# Patient Record
Sex: FEMALE | Race: WHITE | Employment: OTHER | URBAN - METROPOLITAN AREA
[De-identification: names, ages, dates, MRNs, and addresses within clinical notes are randomized per-mention and may not be internally consistent; named-entity substitution may affect disease eponyms.]

---

## 2017-04-13 ENCOUNTER — OP HISTORICAL/CONVERTED ENCOUNTER (OUTPATIENT)
Dept: OTHER | Age: 72
End: 2017-04-13

## 2017-05-11 ENCOUNTER — OP HISTORICAL/CONVERTED ENCOUNTER (OUTPATIENT)
Dept: OTHER | Age: 72
End: 2017-05-11

## 2018-03-16 ENCOUNTER — IP HISTORICAL/CONVERTED ENCOUNTER (OUTPATIENT)
Dept: OTHER | Age: 73
End: 2018-03-16

## 2018-03-21 ENCOUNTER — OP HISTORICAL/CONVERTED ENCOUNTER (OUTPATIENT)
Dept: OTHER | Age: 73
End: 2018-03-21

## 2018-03-28 ENCOUNTER — OP HISTORICAL/CONVERTED ENCOUNTER (OUTPATIENT)
Dept: OTHER | Age: 73
End: 2018-03-28

## 2018-03-30 ENCOUNTER — OP HISTORICAL/CONVERTED ENCOUNTER (OUTPATIENT)
Dept: OTHER | Age: 73
End: 2018-03-30

## 2018-04-04 ENCOUNTER — OP HISTORICAL/CONVERTED ENCOUNTER (OUTPATIENT)
Dept: OTHER | Age: 73
End: 2018-04-04

## 2018-04-06 ENCOUNTER — OP HISTORICAL/CONVERTED ENCOUNTER (OUTPATIENT)
Dept: OTHER | Age: 73
End: 2018-04-06

## 2018-04-09 ENCOUNTER — OP HISTORICAL/CONVERTED ENCOUNTER (OUTPATIENT)
Dept: OTHER | Age: 73
End: 2018-04-09

## 2018-05-24 ENCOUNTER — OP HISTORICAL/CONVERTED ENCOUNTER (OUTPATIENT)
Dept: OTHER | Age: 73
End: 2018-05-24

## 2018-06-28 ENCOUNTER — OP HISTORICAL/CONVERTED ENCOUNTER (OUTPATIENT)
Dept: OTHER | Age: 73
End: 2018-06-28

## 2019-01-29 ENCOUNTER — OP HISTORICAL/CONVERTED ENCOUNTER (OUTPATIENT)
Dept: OTHER | Age: 74
End: 2019-01-29

## 2019-05-28 ENCOUNTER — OP HISTORICAL/CONVERTED ENCOUNTER (OUTPATIENT)
Dept: OTHER | Age: 74
End: 2019-05-28

## 2019-06-06 ENCOUNTER — OP HISTORICAL/CONVERTED ENCOUNTER (OUTPATIENT)
Dept: OTHER | Age: 74
End: 2019-06-06

## 2019-07-23 ENCOUNTER — OP HISTORICAL/CONVERTED ENCOUNTER (OUTPATIENT)
Dept: OTHER | Age: 74
End: 2019-07-23

## 2020-01-23 ENCOUNTER — OP HISTORICAL/CONVERTED ENCOUNTER (OUTPATIENT)
Dept: OTHER | Age: 75
End: 2020-01-23

## 2020-06-17 ENCOUNTER — OP HISTORICAL/CONVERTED ENCOUNTER (OUTPATIENT)
Dept: OTHER | Age: 75
End: 2020-06-17

## 2020-07-09 ENCOUNTER — OP HISTORICAL/CONVERTED ENCOUNTER (OUTPATIENT)
Dept: OTHER | Age: 75
End: 2020-07-09

## 2020-07-14 ENCOUNTER — OFFICE VISIT (OUTPATIENT)
Dept: SURGERY | Age: 75
End: 2020-07-14

## 2020-07-23 VITALS
WEIGHT: 164.6 LBS | SYSTOLIC BLOOD PRESSURE: 140 MMHG | HEIGHT: 65 IN | TEMPERATURE: 98.4 F | BODY MASS INDEX: 27.42 KG/M2 | HEART RATE: 71 BPM | DIASTOLIC BLOOD PRESSURE: 69 MMHG

## 2020-07-23 PROBLEM — N39.0 UTI (URINARY TRACT INFECTION): Status: ACTIVE | Noted: 2020-07-23

## 2020-07-23 PROBLEM — N20.0 KIDNEY STONE: Status: ACTIVE | Noted: 2020-07-23

## 2020-07-23 PROBLEM — N35.919 URETHRAL STRICTURE: Status: ACTIVE | Noted: 2020-07-23

## 2020-07-23 PROBLEM — R35.1 NOCTURIA: Status: ACTIVE | Noted: 2020-07-23

## 2020-07-23 PROBLEM — N20.1 URETERIC STONE: Status: ACTIVE | Noted: 2020-07-23

## 2020-07-23 PROBLEM — R31.29 MICROSCOPIC HEMATURIA: Status: ACTIVE | Noted: 2020-07-23

## 2020-07-23 RX ORDER — BISMUTH SUBSALICYLATE 262 MG
1 TABLET,CHEWABLE ORAL DAILY
COMMUNITY

## 2020-07-23 RX ORDER — PNEUMOCOCCAL 13-VALENT CONJUGATE VACCINE 2.2; 2.2; 2.2; 2.2; 2.2; 4.4; 2.2; 2.2; 2.2; 2.2; 2.2; 2.2; 2.2 UG/.5ML; UG/.5ML; UG/.5ML; UG/.5ML; UG/.5ML; UG/.5ML; UG/.5ML; UG/.5ML; UG/.5ML; UG/.5ML; UG/.5ML; UG/.5ML; UG/.5ML
0.5 INJECTION, SUSPENSION INTRAMUSCULAR
Status: ON HOLD | COMMUNITY
End: 2020-09-30

## 2020-07-23 RX ORDER — PNEUMOCOCCAL VACCINE POLYVALENT 25; 25; 25; 25; 25; 25; 25; 25; 25; 25; 25; 25; 25; 25; 25; 25; 25; 25; 25; 25; 25; 25; 25 UG/.5ML; UG/.5ML; UG/.5ML; UG/.5ML; UG/.5ML; UG/.5ML; UG/.5ML; UG/.5ML; UG/.5ML; UG/.5ML; UG/.5ML; UG/.5ML; UG/.5ML; UG/.5ML; UG/.5ML; UG/.5ML; UG/.5ML; UG/.5ML; UG/.5ML; UG/.5ML; UG/.5ML; UG/.5ML; UG/.5ML
0.5 INJECTION, SOLUTION INTRAMUSCULAR; SUBCUTANEOUS
Status: ON HOLD | COMMUNITY
End: 2020-09-30

## 2020-07-23 RX ORDER — ERYTHROMYCIN 5 MG/G
OINTMENT OPHTHALMIC
Status: ON HOLD | COMMUNITY
End: 2020-09-30

## 2020-07-23 RX ORDER — LISINOPRIL 5 MG/1
TABLET ORAL DAILY
COMMUNITY

## 2020-07-23 RX ORDER — LEVOTHYROXINE SODIUM 75 UG/1
TABLET ORAL
COMMUNITY

## 2020-07-23 RX ORDER — INFLUENZA A VIRUS A/MICHIGAN/45/2015 X-275 (H1N1) ANTIGEN (FORMALDEHYDE INACTIVATED), INFLUENZA A VIRUS A/SINGAPORE/INFIMH-16-0019/2016 IVR-186 (H3N2) ANTIGEN (FORMALDEHYDE INACTIVATED), AND INFLUENZA B VIRUS B/MARYLAND/15/2016 BX-69A (A B/COLORADO/6/2017-LIKE VIRUS) ANTIGEN (FORMALDEHYDE INACTIVATED) 60; 60; 60 UG/.5ML; UG/.5ML; UG/.5ML
0.5 INJECTION, SUSPENSION INTRAMUSCULAR
Status: ON HOLD | COMMUNITY
End: 2020-09-30

## 2020-07-23 RX ORDER — ASPIRIN 81 MG/1
TABLET ORAL DAILY
COMMUNITY

## 2020-07-23 RX ORDER — ATORVASTATIN CALCIUM 10 MG/1
TABLET, FILM COATED ORAL DAILY
COMMUNITY

## 2020-07-23 RX ORDER — POLYETHYLENE GLYCOL 3350 17 G/17G
17 POWDER, FOR SOLUTION ORAL DAILY
COMMUNITY

## 2020-08-10 ENCOUNTER — TELEPHONE (OUTPATIENT)
Dept: SURGERY | Age: 75
End: 2020-08-10

## 2020-09-24 ENCOUNTER — OFFICE VISIT (OUTPATIENT)
Dept: UROLOGY | Age: 75
End: 2020-09-24
Payer: MEDICARE

## 2020-09-24 ENCOUNTER — HOSPITAL ENCOUNTER (OUTPATIENT)
Dept: GENERAL RADIOLOGY | Age: 75
Discharge: HOME | End: 2020-09-24
Payer: MEDICARE

## 2020-09-24 VITALS
TEMPERATURE: 98.3 F | WEIGHT: 166 LBS | DIASTOLIC BLOOD PRESSURE: 77 MMHG | HEIGHT: 65 IN | BODY MASS INDEX: 27.66 KG/M2 | SYSTOLIC BLOOD PRESSURE: 135 MMHG

## 2020-09-24 VITALS
BODY MASS INDEX: 26.82 KG/M2 | DIASTOLIC BLOOD PRESSURE: 72 MMHG | SYSTOLIC BLOOD PRESSURE: 124 MMHG | TEMPERATURE: 98.1 F | WEIGHT: 161 LBS | HEIGHT: 65 IN

## 2020-09-24 DIAGNOSIS — R31.29 MICROSCOPIC HEMATURIA: ICD-10-CM

## 2020-09-24 DIAGNOSIS — N39.0 URINARY TRACT INFECTION WITHOUT HEMATURIA, SITE UNSPECIFIED: ICD-10-CM

## 2020-09-24 DIAGNOSIS — N20.0 KIDNEY STONE: ICD-10-CM

## 2020-09-24 DIAGNOSIS — N20.1 URETERIC STONE: Primary | ICD-10-CM

## 2020-09-24 DIAGNOSIS — R35.1 NOCTURIA: ICD-10-CM

## 2020-09-24 LAB
BILIRUB UR QL STRIP: NEGATIVE
GLUCOSE UR-MCNC: NEGATIVE MG/DL
KETONES P FAST UR STRIP-MCNC: NEGATIVE MG/DL
PH UR STRIP: 7 [PH] (ref 4.6–8)
PROT UR QL STRIP: NEGATIVE
SP GR UR STRIP: 1.01 (ref 1–1.03)
UA UROBILINOGEN AMB POC: NORMAL (ref 0.2–1)
URINALYSIS CLARITY POC: CLEAR
URINALYSIS COLOR POC: YELLOW
URINE BLOOD POC: NORMAL
URINE LEUKOCYTES POC: NORMAL
URINE NITRITES POC: NEGATIVE

## 2020-09-24 PROCEDURE — 74018 RADEX ABDOMEN 1 VIEW: CPT

## 2020-09-24 PROCEDURE — 81003 URINALYSIS AUTO W/O SCOPE: CPT | Performed by: UROLOGY

## 2020-09-24 PROCEDURE — 99214 OFFICE O/P EST MOD 30 MIN: CPT | Performed by: UROLOGY

## 2020-09-24 PROCEDURE — G8427 DOCREV CUR MEDS BY ELIG CLIN: HCPCS | Performed by: UROLOGY

## 2020-09-24 RX ORDER — OXYCODONE AND ACETAMINOPHEN 10; 325 MG/1; MG/1
1 TABLET ORAL
Qty: 7 TAB | Refills: 0 | Status: ON HOLD | OUTPATIENT
Start: 2020-09-24 | End: 2020-09-30

## 2020-09-24 RX ORDER — TIZANIDINE 4 MG/1
4 TABLET ORAL
Status: ON HOLD | COMMUNITY
End: 2020-09-30

## 2020-09-24 RX ORDER — NITROFURANTOIN 25; 75 MG/1; MG/1
100 CAPSULE ORAL 2 TIMES DAILY
Status: ON HOLD | COMMUNITY
End: 2020-09-30

## 2020-09-24 NOTE — PROGRESS NOTES
HPI ROS PE NOTE          History of Present Illness   Chief complaint: Follow-up for kidney stones  La Serna is a 67 y.o. female who presents with recurrent urolithiasis. Was seen 6 months ago had a KUB done showed large lower pole left renal calculus and some smaller stones. Previously had extracorporal shockwave lithotripsies for stones on both sides. .  Last ESWL was 2018. Has also been treated for recurrent urinary tract infections, none recently. The patient reports that she had an episode of left flank and abdominal pain associated with nausea and vomiting about 3 to 4 weeks ago but that this subsided without further incident. KUB x-ray today appears to demonstrate a large left upper ureteral/ureteropelvic junction calculus on the left side smaller stones in the kidney. Patient is not currently having any flank or abdominal pain or gastrointestinal symptoms. Past Medical History:   Diagnosis Date    Arthritis     Asthma     Dizziness     Headache     Hypercholesterolemia     Hypertension     Kidney stone 7/23/2020    Long term current use of anticoagulant therapy     asa 81mg qd    Microscopic hematuria 7/23/2020    Thyroid disease     Ureteric stone 7/23/2020    Urethral stricture 7/23/2020    UTI (urinary tract infection) 7/23/2020      Past Surgical History:   Procedure Laterality Date    HX HYSTERECTOMY      HX LITHOTRIPSY      HX ORTHOPAEDIC      rt shoulder fx repair    HX REFRACTIVE SURGERY Bilateral     HX THYROIDECTOMY      HX TURP      cystoscopy    HX UROLOGICAL       Family History   Problem Relation Age of Onset    Alzheimer Mother     Heart Disease Father       Social History     Tobacco Use    Smoking status: Never Smoker    Smokeless tobacco: Never Used   Substance Use Topics    Alcohol use: Never     Frequency: Never       Prior to Admission medications    Medication Sig Start Date End Date Taking?  Authorizing Provider   aspirin delayed-release 81 mg tablet Take  by mouth daily. Yes Provider, Historical   atorvastatin (LIPITOR) 10 mg tablet Take  by mouth daily. Yes Provider, Historical   vitamin B complex (B COMPLETE PO) Take  by mouth. Yes Provider, Historical   calcium carb/vit D3/minerals (CALCIUM-VITAMIN D PO) Take  by mouth. Yes Provider, Historical   levothyroxine (SYNTHROID) 75 mcg tablet Take  by mouth Daily (before breakfast). Yes Provider, Historical   lisinopriL (PRINIVIL, ZESTRIL) 5 mg tablet Take  by mouth daily. Yes Provider, Historical   polyethylene glycol (Miralax) 17 gram/dose powder Take 17 g by mouth daily. Yes Provider, Historical   multivitamin (ONE A DAY) tablet Take 1 Tab by mouth daily. Yes Provider, Historical   tiZANidine (ZANAFLEX) 4 mg tablet Take 4 mg by mouth three (3) times daily as needed for Muscle Spasm(s). Provider, Historical   nitrofurantoin, macrocrystal-monohydrate, (Macrobid) 100 mg capsule Take 100 mg by mouth two (2) times a day. Provider, Historical   erythromycin (ILOTYCIN) ophthalmic ointment Administer  to both eyes nightly. Provider, Historical   influenza vaccine 2018-19, 65 yrs+,,PF, (Fluad [id number], 65 yr up,,PF,) syrg injection 0.5 mL by IntraMUSCular route PRIOR TO DISCHARGE. Provider, Historical   flu vacc [id number],65yr,up,/PF (FLUZONE HIGH-DOSE 2015-16, PF, IM) by IntraMUSCular route. Provider, Historical   influenza vaccine 2016-17, 65 yr+,,PF, (Fluzone High-Dose 2016-17, PF,) syrg injection 0.5 mL by IntraMUSCular route PRIOR TO DISCHARGE. Provider, Historical   influenza vaccine 2019-20, 65 yrs+,,PF, (Fluzone High-Dose 2019-20, PF,) syrg injection 0.5 mL by IntraMUSCular route PRIOR TO DISCHARGE. Provider, Historical   pneumococcal 23-valent (Pneumovax-23) 25 mcg/0.5 mL injection 0.5 mL by IntraMUSCular route PRIOR TO DISCHARGE.     Provider, Historical   pneumococcal 13 beatriz conj dip (Prevnar 13, PF,) 0.5 mL syrg injection 0.5 mL by IntraMUSCular route PRIOR TO DISCHARGE. Provider, Historical     Allergies   Allergen Reactions    Advair Diskus [Fluticasone Propion-Salmeterol] Cough    Mycobacterium Tuberculosis (Tuberculin Ppd) Rash    Sulfa (Sulfonamide Antibiotics) Hives and Rash    Cristian-Dur [Theophylline] Other (comments)    Tuberculin Ppd Other (comments)        Review of Systems:  Constitutional: negative  Respiratory: negative  Cardiovascular: negative  Gastrointestinal: positive for constipation  Genitourinary:positive for frequency and nocturia  Musculoskeletal:positive for arthralgias and stiff joints  Neurological: negative  Behavioral/Psychiatric: negative     Physical Exam     Physical Exam:   Visit Vitals  /77   Temp 98.3 °F (36.8 °C) (Temporal)   Ht 5' 5\" (1.651 m)   Wt 166 lb (75.3 kg)   BMI 27.62 kg/m²     General appearance: alert, cooperative, no distress, appears stated age  Head: Normocephalic, without obvious abnormality, atraumatic  Lungs: clear to auscultation bilaterally  Heart: regular rate and rhythm, S1, S2 normal, no murmur, click, rub or gallop  Abdomen: soft, non-tender.  Bowel sounds normal. No masses,  no organomegaly  Extremities: extremities normal, atraumatic, no cyanosis or edema  Skin: Skin color, texture, turgor normal. No rashes or lesions  Neurologic: Grossly normal    Data Review:   Recent Results (from the past 48 hour(s))   AMB POC URINALYSIS DIP STICK AUTO W/O MICRO    Collection Time: 09/24/20  9:06 AM   Result Value Ref Range    Color (UA POC) Yellow     Clarity (UA POC) Clear     Glucose (UA POC) Negative Negative    Bilirubin (UA POC) Negative Negative    Ketones (UA POC) Negative Negative    Specific gravity (UA POC) 1.015 1.001 - 1.035    Blood (UA POC) Trace Negative    pH (UA POC) 7.0 4.6 - 8.0    Protein (UA POC) Negative Negative    Urobilinogen (UA POC) 0.2 mg/dL 0.2 - 1    Nitrites (UA POC) Negative Negative    Leukocyte esterase (UA POC) 1+ Negative     No results for input(s): 48 in the last 72 hours.      Assessment and Plan:    Recurrent urolithiasis, apparent left upper ureteral/ ureteropelvic junction calculus seen on KUB today, patient went home and brought her previous x-rays from earlier this year for comparison; stone seen on today's x-ray previously present with compared x-rays from earlier this year located in the lower pole of the left kidney. Recommend extracorporal shockwave lithotripsy of this calculus 8 to 9 mm in size,  not passable. Extended consultation 30 minutes with review of x-rays and face-to-face counseling and plan; pt agrees to procedure;  Microscopic hematuria,,leukocytes in urine    Ms. Claudia Araiza has a reminder for a \"due or due soon\" health maintenance. I have asked that she contact her primary care provider for follow-up on this health maintenance. Pj Barreto M.D.  9/24/2020

## 2020-09-24 NOTE — LETTER
9/24/20 Patient: Georgia Link YOB: 1945 Date of Visit: 9/24/2020 Janene Cox, [address] [address] VIA Facsimile: [phone] Dear Janene Cox DO, Thank you for referring Ms. Vianney Nguyễn to [address] [place] [address] for evaluation. My notes for this consultation are attached. If you have questions, please do not hesitate to call me. I look forward to following your patient along with you. Sincerely, Lizzeth Palafox MD

## 2020-09-26 ENCOUNTER — HOSPITAL ENCOUNTER (OUTPATIENT)
Dept: PREADMISSION TESTING | Age: 75
Discharge: HOME | End: 2020-09-26
Payer: MEDICARE

## 2020-09-26 LAB
BACTERIA UR CULT: NORMAL
SARS-COV-2, COV2: NORMAL

## 2020-09-26 PROCEDURE — 87635 SARS-COV-2 COVID-19 AMP PRB: CPT

## 2020-09-30 ENCOUNTER — HOSPITAL ENCOUNTER (OUTPATIENT)
Age: 75
Discharge: HOME | End: 2020-09-30
Attending: UROLOGY | Admitting: UROLOGY
Payer: MEDICARE

## 2020-09-30 ENCOUNTER — ANESTHESIA (OUTPATIENT)
Dept: SURGERY | Age: 75
End: 2020-09-30
Payer: MEDICARE

## 2020-09-30 ENCOUNTER — APPOINTMENT (OUTPATIENT)
Dept: GENERAL RADIOLOGY | Age: 75
End: 2020-09-30
Attending: UROLOGY
Payer: MEDICARE

## 2020-09-30 ENCOUNTER — ANESTHESIA EVENT (OUTPATIENT)
Dept: SURGERY | Age: 75
End: 2020-09-30
Payer: MEDICARE

## 2020-09-30 VITALS
DIASTOLIC BLOOD PRESSURE: 72 MMHG | RESPIRATION RATE: 20 BRPM | OXYGEN SATURATION: 99 % | SYSTOLIC BLOOD PRESSURE: 146 MMHG | HEART RATE: 71 BPM | TEMPERATURE: 97.5 F | WEIGHT: 164 LBS | BODY MASS INDEX: 27.29 KG/M2

## 2020-09-30 DIAGNOSIS — N20.1 URETERIC STONE: Primary | ICD-10-CM

## 2020-09-30 PROBLEM — N20.0 CALCULUS OF KIDNEY: Status: ACTIVE | Noted: 2020-09-30

## 2020-09-30 LAB
APTT PPP: 27.5 SEC (ref 23–35.7)
INR PPP: 0.9 (ref 0.9–1.1)
PROTHROMBIN TIME: 12.5 SEC (ref 11.9–14.7)
SARS-COV-2, COV2NT: NOT DETECTED
THERAPEUTIC RANGE,PTTT: NORMAL SEC (ref 68–109)

## 2020-09-30 PROCEDURE — 77030010509 HC AIRWY LMA MSK TELE -A: Performed by: NURSE ANESTHETIST, CERTIFIED REGISTERED

## 2020-09-30 PROCEDURE — 74011000258 HC RX REV CODE- 258: Performed by: UROLOGY

## 2020-09-30 PROCEDURE — 85610 PROTHROMBIN TIME: CPT

## 2020-09-30 PROCEDURE — 76010000160 HC OR TIME 0.5 TO 1 HR INTENSV-TIER 1: Performed by: UROLOGY

## 2020-09-30 PROCEDURE — 74011000250 HC RX REV CODE- 250: Performed by: NURSE ANESTHETIST, CERTIFIED REGISTERED

## 2020-09-30 PROCEDURE — 76060000032 HC ANESTHESIA 0.5 TO 1 HR: Performed by: UROLOGY

## 2020-09-30 PROCEDURE — 85730 THROMBOPLASTIN TIME PARTIAL: CPT

## 2020-09-30 PROCEDURE — 74011250636 HC RX REV CODE- 250/636: Performed by: UROLOGY

## 2020-09-30 PROCEDURE — 76210000021 HC REC RM PH II 0.5 TO 1 HR: Performed by: UROLOGY

## 2020-09-30 PROCEDURE — 74019 RADEX ABDOMEN 2 VIEWS: CPT

## 2020-09-30 PROCEDURE — 2709999900 HC NON-CHARGEABLE SUPPLY: Performed by: UROLOGY

## 2020-09-30 PROCEDURE — 36415 COLL VENOUS BLD VENIPUNCTURE: CPT

## 2020-09-30 PROCEDURE — 74011250636 HC RX REV CODE- 250/636: Performed by: NURSE ANESTHETIST, CERTIFIED REGISTERED

## 2020-09-30 PROCEDURE — 76210000006 HC OR PH I REC 0.5 TO 1 HR: Performed by: UROLOGY

## 2020-09-30 RX ORDER — PROPOFOL 10 MG/ML
INJECTION, EMULSION INTRAVENOUS AS NEEDED
Status: DISCONTINUED | OUTPATIENT
Start: 2020-09-30 | End: 2020-09-30 | Stop reason: HOSPADM

## 2020-09-30 RX ORDER — ONDANSETRON 2 MG/ML
INJECTION INTRAMUSCULAR; INTRAVENOUS AS NEEDED
Status: DISCONTINUED | OUTPATIENT
Start: 2020-09-30 | End: 2020-09-30 | Stop reason: HOSPADM

## 2020-09-30 RX ORDER — SODIUM CHLORIDE, SODIUM LACTATE, POTASSIUM CHLORIDE, CALCIUM CHLORIDE 600; 310; 30; 20 MG/100ML; MG/100ML; MG/100ML; MG/100ML
20 INJECTION, SOLUTION INTRAVENOUS CONTINUOUS
Status: DISCONTINUED | OUTPATIENT
Start: 2020-09-30 | End: 2020-09-30 | Stop reason: HOSPADM

## 2020-09-30 RX ORDER — LIDOCAINE HYDROCHLORIDE 20 MG/ML
INJECTION, SOLUTION EPIDURAL; INFILTRATION; INTRACAUDAL; PERINEURAL AS NEEDED
Status: DISCONTINUED | OUTPATIENT
Start: 2020-09-30 | End: 2020-09-30 | Stop reason: HOSPADM

## 2020-09-30 RX ADMIN — CEFAZOLIN SODIUM 1 G: 1 INJECTION, POWDER, FOR SOLUTION INTRAMUSCULAR; INTRAVENOUS at 16:10

## 2020-09-30 RX ADMIN — LIDOCAINE HYDROCHLORIDE 100 MG: 20 INJECTION, SOLUTION EPIDURAL; INFILTRATION; INTRACAUDAL; PERINEURAL at 16:06

## 2020-09-30 RX ADMIN — SODIUM CHLORIDE, POTASSIUM CHLORIDE, SODIUM LACTATE AND CALCIUM CHLORIDE 20 ML/HR: 600; 310; 30; 20 INJECTION, SOLUTION INTRAVENOUS at 12:45

## 2020-09-30 RX ADMIN — ONDANSETRON 4 MG: 2 INJECTION INTRAMUSCULAR; INTRAVENOUS at 16:06

## 2020-09-30 RX ADMIN — SODIUM CHLORIDE, POTASSIUM CHLORIDE, SODIUM LACTATE AND CALCIUM CHLORIDE: 600; 310; 30; 20 INJECTION, SOLUTION INTRAVENOUS at 15:56

## 2020-09-30 RX ADMIN — PROPOFOL 150 MG: 10 INJECTION, EMULSION INTRAVENOUS at 16:06

## 2020-09-30 NOTE — BRIEF OP NOTE
Brief Postoperative Note    Patient: Aldo Casiano  YOB: 1945  MRN: [id number]    Date of Procedure: 9/30/2020     Pre-Op Diagnosis: Calculus of ureter [N20.1]    Post-Op Diagnosis: Same as preoperative diagnosis. Procedure(s):  Left Upper Ureteral Extracorporeal Shockwave Lithotripsy    Surgeon(s):  MD Kristina Chris Tricia Rendon R.TTricia     Anesthesia: General     Estimated Blood Loss (mL): Minimal    Complications: None    Specimens: none      Implants: none    Drains: none     Findings: Large 8 to 9 mm left upper ureteral calculus, apparent good pulverization    Electronically Signed by Antonio Stinson MD on 9/30/2020 at 5:01 PM

## 2020-09-30 NOTE — PERIOP NOTES
DR CORTEZ IN TO SEE PT AND LOOKED AT TINY STONES AND INSTRUCTED PT TO TAKE THOSE WITH HER AND WHEN SHE COLLECT MORE STONES BRING THEM TO HIS OFFICE TO SEND FOR ANALYSIS

## 2020-09-30 NOTE — ANESTHESIA PREPROCEDURE EVALUATION
Relevant Problems   No relevant active problems       Anesthetic History   No history of anesthetic complications            Review of Systems / Medical History  Patient summary reviewed, nursing notes reviewed and pertinent labs reviewed    Pulmonary            Asthma        Neuro/Psych   Within defined limits           Cardiovascular    Hypertension          Hyperlipidemia         GI/Hepatic/Renal         Renal disease: stones       Endo/Other      Hypothyroidism  Arthritis     Other Findings   Comments: Ht: 5' 5\" (165.1 cm)  Weight: 74.4 kg (164 lb)  BMI: 27.29 kg/m²  Procedure  Left Upper Ureteral Extracorporeal Shockwave Lithotripsy - Left      Medical History  Arthritis  Asthma  Hypercholesterolemia  Thyroid disease  Urethral stricture  UTI (urinary tract infection)  Ureteric stone  Microscopic hematuria  Kidney stone  Hypertension  Long term current use of anticoagulant therapy  Shoulder fracture, right           Physical Exam    Airway  Mallampati: II  TM Distance: 4 - 6 cm  Neck ROM: normal range of motion   Mouth opening: Normal     Cardiovascular    Rhythm: regular  Rate: normal         Dental         Pulmonary  Breath sounds clear to auscultation               Abdominal  GI exam deferred       Other Findings            Anesthetic Plan    ASA: 3  Anesthesia type: general          Induction: Intravenous  Anesthetic plan and risks discussed with: Patient and Family

## 2020-10-01 NOTE — OP NOTES
[facility]  OPERATIVE REPORT    Name:  Philipp Little  MR#:  [id number]  :  1945  ACCOUNT #:  [de-identified]  DATE OF SERVICE:  2020    HISTORY:  This is a 27-year-old female that has a left upper ureteral calculus about 8-9 mm in size at ureteropelvic junction, formerly in the lower pole of the kidney. She has fortunately not had significant symptoms, but stone is undergoing pulverization with ESWL (extracorporeal shock wave lithotripsy) today. PREOPERATIVE DIAGNOSIS:  Left upper ureteral calculus. POSTOPERATIVE DIAGNOSIS:  Left upper ureteral calculus. PROCEDURE PERFORMED:  Extracorporeal shock wave lithotripsy. SURGEON:  Shantanu Calvillo MD    ASSISTANT:  GARLAND Marques    ANESTHESIA:  general    COMPLICATIONS:  None. SPECIMENS REMOVED:  noone. IMPLANTS:  None. ESTIMATED BLOOD LOSS:  Negligible. DRAINS:  None. PROCEDURE:  With the patient in the supine position under satisfactory general anesthesia, extracorporeal shock wave lithotripsy was performed with the meQuilibrium lithotripter. 3000 shocks were applied with maximum power of 7 with apparent good pulverization of the stone on fluoroscopy. A postoperative x-ray will be done, and we will more precisely assess the success of the procedure. She received Ancef 1 g prior to the procedure. She will be seen in the office in followup in 2 weeks, hopefully having passed most of the stone fragments. She has oxycodone 10/325 available for pain medicine. She was returned to the recovery room in satisfactory condition having tolerated the procedure well. FINAL DIAGNOSIS:  Left upper ureteral calculus. Sabi Jauregui., MD      [id number]  D:  2020 17:18  T:  2020 22:47  JOB #:  [id number]  CC:   Josué Maldonado DO

## 2020-10-01 NOTE — ANESTHESIA POSTPROCEDURE EVALUATION
Procedure(s):  Left Upper Ureteral Extracorporeal Shockwave Lithotripsy.     general    Anesthesia Post Evaluation      Multimodal analgesia: multimodal analgesia used between 6 hours prior to anesthesia start to PACU discharge  Patient location during evaluation: PACU  Patient participation: complete - patient participated  Level of consciousness: awake  Pain score: 0  Pain management: adequate  Airway patency: patent  Anesthetic complications: no  Cardiovascular status: acceptable  Respiratory status: acceptable  Hydration status: acceptable  Post anesthesia nausea and vomiting:  controlled  Final Post Anesthesia Temperature Assessment:  Normothermia (36.0-37.5 degrees C)      INITIAL Post-op Vital signs:   Vitals Value Taken Time   /87 9/30/2020  5:15 PM   Temp 36.4 °C (97.5 °F) 9/30/2020  5:15 PM   Pulse 73 9/30/2020  5:15 PM   Resp 20 9/30/2020  5:15 PM   SpO2 100 % 9/30/2020  5:15 PM

## 2020-10-13 ENCOUNTER — TRANSCRIBE ORDER (OUTPATIENT)
Dept: SCHEDULING | Age: 75
End: 2020-10-13

## 2020-10-13 ENCOUNTER — OFFICE VISIT (OUTPATIENT)
Dept: SURGERY | Age: 75
End: 2020-10-13
Payer: MEDICARE

## 2020-10-13 VITALS
WEIGHT: 167 LBS | TEMPERATURE: 97.7 F | BODY MASS INDEX: 27.82 KG/M2 | HEIGHT: 65 IN | HEART RATE: 71 BPM | DIASTOLIC BLOOD PRESSURE: 70 MMHG | SYSTOLIC BLOOD PRESSURE: 152 MMHG

## 2020-10-13 DIAGNOSIS — N64.59 INVERSION OF LEFT NIPPLE: Primary | ICD-10-CM

## 2020-10-13 DIAGNOSIS — Z12.31 SCREENING MAMMOGRAM, ENCOUNTER FOR: ICD-10-CM

## 2020-10-13 DIAGNOSIS — Z12.31 ENCOUNTER FOR SCREENING MAMMOGRAM FOR MALIGNANT NEOPLASM OF BREAST: Primary | ICD-10-CM

## 2020-10-13 PROCEDURE — 99213 OFFICE O/P EST LOW 20 MIN: CPT | Performed by: SURGERY

## 2020-10-13 NOTE — PROGRESS NOTES
1. Have you been to the ER, urgent care clinic since your last visit? Hospitalized since your last visit? No    2. Have you seen or consulted any other health care providers outside of the [address] [place] [address] since your last visit? Include any pap smears or colon screening. No     Patient is here for f/u nipple inversion and left breast pain. No other complaints.

## 2020-10-13 NOTE — PROGRESS NOTES
This is a follow-up [address] [place] [address] visit for this patient who presents for left breast pain and left nipple inversion    HPI: The patient's only current breast complaint is that of left breast pain and left nipple inversion. has had pain for several weeks. not related to trauma. No associated signs and symptoms. She has no additional breast complaints at this time. She denies specific breast lumps, nipple discharge, skin changes or other problems. She has no previous history of breast disease or breast biopsies. INTERVAL HISTORY: Left breast pain has resolved. Says she has had left shoulder pain since her last clinic visit with us, diagnosed bursitis. Also now resolved after a course of steroids. Left nipple inversion is no longer persistent. The left nipple everts most of the time and only occasionally does it invert. Continues to deny any other breast complaints, including masses, nipple discharge, skin changes    Breast cancer risk factors:  Age at menarche: 14    Age at first full-term pregnancy: 24  Breast feeding: no  Menopausal status: post  Postmenopausal hormone replacement therapy use: no  Birth control pill use: no  Fertility drug use: no  Breast size: 36D    Family history:  brother w prostate cancer in his 62s  sister w colorectal cancer in her 46s  brother w throat cancer in his 62s, smoker  patient is 1 of 7      ROS  Patient reports resolution of pain in the left breast. She reports no fever, no significant weight loss, and normal appetite. She reports no visual changes. She reports no difficulty hearing. She reports no chest pain. She reports no shortness of breath. She reports no nausea and no vomiting. She reports no hematuria. She reports no muscle aches, no arthralgias/joint pain, and no back pain. She reports no abnormal mole. She reports no loss of balance or falls. She reports feeling safe in relationship. She reports no fatigue. She reports no bleeding disorders.  She reports no runny nose. She reports no breast mass. She reports nipple inversion left, less often than before. Additionally reports: I personally performed the ROS. SE      Physical Exam  General Appearance: healthy-appearing. Level of Distress: no acute distress. Ambulation: ambulating normally. Head: normocephalic. Neck: supple and no masses. Lymph Nodes: no cervical LAD, supraclavicular LAD, or axillary LAD. Breast: no masses or abnormal secretions and normal appearance. Right Breast: no erythema, induration, tenderness, ecchymosis, skin changes, distinct masses, abnormal secretions, or axillary lymphadenopathy and normal nipple appearance. Left Breast: no erythema, induration, ecchymosis, skin changes, distinct masses, abnormal secretions, or axillary lymphadenopathy. Left nipple inversion upon examination while supine. Lungs: Respiratory effort: no dyspnea. Back: Thoracolumbar Appearance: normal curvature. Abdomen: Inspection and Palpation: soft and no tenderness. Liver: no hepatomegaly. Spleen: no splenomegaly. Skin: Inspection and palpation: no jaundice. Extremities: no edema. Motor Strength and Tone: normal motor strength. Joints, Bones, and Muscles: normal movement of all extremities. Cranial Nerves: grossly intact. Sensation: grossly intact. Insight: good judgement and insight. Mental Status: active and alert and normal mood. 07/2020 b/l breast MRI: BIRADS-1    07.09.2020 left breast dx mmg and US: BIRADS-2    06.17.2020 b/l screening mammogram: heterogeneously dense, BIRADS-1    Assessment / Plan  67 y.o. woman w heterogeneously dense breasts, w left nipple inversion, less frequent than before w no other symptoms. Benign breast MRI. Return to clinic at the time of her next mammogram for reevaluation. She was advised to call the office w any questions or concerns. All questions were answered to her satisfaction.  This encounter lasted 15 minutes, and over 50% of this visit was spent in counseling the patient and coordination of care.

## 2020-10-13 NOTE — LETTER
10/13/20 Patient: Marysol Flor YOB: 1945 Date of Visit: 10/13/2020 Tony Brown, [address] [place] [facility] [address] [address] VIA Facsimile: [phone] Juarez Delgado MD 
[address] [address] [facility] [address] VIA In Basket Dear Tony Brown, DO Juarez Delgado MD, Thank you [facility]. My notes for this consultation are attached. If you have questions, please do not hesitate to call me. I look forward to following your patient along with you. Sincerely, Rocío Obrien MD 
 

Patient/Caregiver provided printed discharge information.

## 2020-10-16 ENCOUNTER — OFFICE VISIT (OUTPATIENT)
Dept: UROLOGY | Age: 75
End: 2020-10-16
Payer: MEDICARE

## 2020-10-16 ENCOUNTER — TRANSCRIBE ORDER (OUTPATIENT)
Dept: REGISTRATION | Age: 75
End: 2020-10-16

## 2020-10-16 ENCOUNTER — HOSPITAL ENCOUNTER (OUTPATIENT)
Dept: GENERAL RADIOLOGY | Age: 75
Discharge: HOME | End: 2020-10-16
Payer: MEDICARE

## 2020-10-16 VITALS
SYSTOLIC BLOOD PRESSURE: 124 MMHG | TEMPERATURE: 97.4 F | DIASTOLIC BLOOD PRESSURE: 78 MMHG | HEIGHT: 65 IN | BODY MASS INDEX: 27.32 KG/M2 | WEIGHT: 164 LBS

## 2020-10-16 DIAGNOSIS — R31.29 MICROSCOPIC HEMATURIA: ICD-10-CM

## 2020-10-16 DIAGNOSIS — R35.1 NOCTURIA: ICD-10-CM

## 2020-10-16 DIAGNOSIS — N20.0 URIC ACID NEPHROLITHIASIS: Primary | ICD-10-CM

## 2020-10-16 DIAGNOSIS — N20.0 KIDNEY STONE: ICD-10-CM

## 2020-10-16 DIAGNOSIS — N20.0 KIDNEY STONE: Primary | ICD-10-CM

## 2020-10-16 DIAGNOSIS — N20.0 URIC ACID NEPHROLITHIASIS: ICD-10-CM

## 2020-10-16 DIAGNOSIS — N35.82 OTHER STRICTURE OF URETHRA IN FEMALE: ICD-10-CM

## 2020-10-16 DIAGNOSIS — N20.1 URETERIC STONE: ICD-10-CM

## 2020-10-16 DIAGNOSIS — N20.0 CALCULUS OF KIDNEY: ICD-10-CM

## 2020-10-16 DIAGNOSIS — N39.0 URINARY TRACT INFECTION WITHOUT HEMATURIA, SITE UNSPECIFIED: ICD-10-CM

## 2020-10-16 LAB
BILIRUB UR QL STRIP: NEGATIVE
GLUCOSE UR-MCNC: NEGATIVE MG/DL
KETONES P FAST UR STRIP-MCNC: NEGATIVE MG/DL
PH UR STRIP: 6.5 [PH] (ref 4.6–8)
PROT UR QL STRIP: NEGATIVE
SP GR UR STRIP: 1.02 (ref 1–1.03)
UA UROBILINOGEN AMB POC: NORMAL (ref 0.2–1)
URINALYSIS CLARITY POC: CLEAR
URINALYSIS COLOR POC: YELLOW
URINE BLOOD POC: NORMAL
URINE LEUKOCYTES POC: NORMAL
URINE NITRITES POC: NEGATIVE

## 2020-10-16 PROCEDURE — 74018 RADEX ABDOMEN 1 VIEW: CPT

## 2020-10-16 PROCEDURE — 99024 POSTOP FOLLOW-UP VISIT: CPT | Performed by: UROLOGY

## 2020-10-16 PROCEDURE — 81003 URINALYSIS AUTO W/O SCOPE: CPT | Performed by: UROLOGY

## 2020-10-16 NOTE — PROGRESS NOTES
HPI ROS PE NOTE          History of Present Illness   Chief complaint: Left Upper ureteral calculus ureteropelvic junction, 8 mm status post ESWL 1 week  Marysol Flor is a 67 y.o. female who presents with follow-up after ESWL; patient has passed some stone fragments and these will be sent to lab for analysis. Patient has had no pain from her stone after her procedure. Patient had KUB done today as part of this visit reviewed by me with finding of persistent large amount of calculus still present in the upper ureter. She has no dysuria or visible hematuria present, denies frequency and nocturia. Past Medical History:   Diagnosis Date    Arthritis     Asthma     Hypercholesterolemia     Hypertension     Kidney stone 7/23/2020    Long term current use of anticoagulant therapy     asa 81mg qd    Microscopic hematuria 7/23/2020    Shoulder fracture, right     NO SURGERY    Thyroid disease     Ureteric stone 7/23/2020    Urethral stricture 7/23/2020    UTI (urinary tract infection) 7/23/2020      Past Surgical History:   Procedure Laterality Date    HX HYSTERECTOMY      HX LITHOTRIPSY      HX LITHOTRIPSY      HX REFRACTIVE SURGERY Bilateral     CATARACT REMOVAL    HX THYROIDECTOMY      HX TURP      cystoscopy    HX UROLOGICAL       Family History   Problem Relation Age of Onset    Alzheimer Mother     Heart Disease Father       Social History     Tobacco Use    Smoking status: Never Smoker    Smokeless tobacco: Never Used   Substance Use Topics    Alcohol use: Never     Frequency: Never       Prior to Admission medications    Medication Sig Start Date End Date Taking? Authorizing Provider   aspirin delayed-release 81 mg tablet Take  by mouth daily. Yes Provider, Historical   atorvastatin (LIPITOR) 10 mg tablet Take  by mouth daily. Yes Provider, Historical   vitamin B complex (B COMPLETE PO) Take  by mouth.    Yes Provider, Historical   calcium carb/vit D3/minerals (CALCIUM-VITAMIN D PO) Take  by mouth. Yes Provider, Historical   levothyroxine (SYNTHROID) 75 mcg tablet Take  by mouth Daily (before breakfast). Yes Provider, Historical   lisinopriL (PRINIVIL, ZESTRIL) 5 mg tablet Take  by mouth daily. Yes Provider, Historical   polyethylene glycol (Miralax) 17 gram/dose powder Take 17 g by mouth daily. As needed   Yes Provider, Historical   multivitamin (ONE A DAY) tablet Take 1 Tab by mouth daily. Yes Provider, Historical     Allergies   Allergen Reactions    Advair Diskus [Fluticasone Propion-Salmeterol] Swelling     Swelling to throat    Mycobacterium Tuberculosis (Tuberculin Ppd) Rash    Sulfa (Sulfonamide Antibiotics) Hives and Rash    Cristian-Dur [Theophylline] Other (comments)    Tuberculin Ppd Other (comments)        Review of Systems:  A comprehensive review of systems was negative except for that written in the History of Present Illness. Physical Exam     Physical Exam:   Visit Vitals  /78 (BP 1 Location: Left arm, BP Patient Position: Sitting)   Temp 97.4 °F (36.3 °C) (Temporal)   Ht 5' 5\" (1.651 m)   Wt 164 lb (74.4 kg)   BMI 27.29 kg/m²     General:  Alert, cooperative, no distress, appears stated age. Head:  Normocephalic, without obvious abnormality, atraumatic. Eyes:  Conjunctivae/corneas clear. PERRL, EOMs intact. Fundi benign. Ears:  Normal TMs and external ear canals both ears. Nose: Nares normal. Septum midline. Mucosa normal. No drainage or sinus tenderness. Throat: Lips, mucosa, and tongue normal. Teeth and gums normal.   Neck: Supple, symmetrical, trachea midline, no adenopathy, thyroid: no enlargement/tenderness/nodules, no carotid bruit and no JVD. Back:   Symmetric, no curvature. ROM normal. No CVA tenderness. Lungs:   Clear to auscultation bilaterally. Chest wall:  No tenderness or deformity. Heart:  Regular rate and rhythm, S1, S2 normal, no murmur, click, rub or gallop.    Breast Exam:  No tenderness, masses, or nipple abnormality. Abdomen:   Soft, non-tender. Bowel sounds normal. No masses,  No organomegaly. Genitalia:  Normal female without lesion, discharge or tenderness. Rectal:  Normal tone,  no masses or tenderness  Guaiac negative stool. Extremities: Extremities normal, atraumatic, no cyanosis or edema. Pulses: 2+ and symmetric all extremities. Skin: Skin color, texture, turgor normal. No rashes or lesions. Lymph nodes: Cervical, supraclavicular, and axillary nodes normal.   Neurologic: CNII-XII intact. Normal strength, sensation and reflexes throughout. Data Review:   Recent Results (from the past 50 hour(s))   AMB POC URINALYSIS DIP STICK AUTO W/O MICRO    Collection Time: 10/16/20 10:00 AM   Result Value Ref Range    Color (UA POC) Yellow     Clarity (UA POC) Clear     Glucose (UA POC) Negative Negative    Bilirubin (UA POC) Negative Negative    Ketones (UA POC) Negative Negative    Specific gravity (UA POC) 1.020 1.001 - 1.035    Blood (UA POC) Trace Negative    pH (UA POC) 6.5 4.6 - 8.0    Protein (UA POC) Negative Negative    Urobilinogen (UA POC) 0.2 mg/dL 0.2 - 1    Nitrites (UA POC) Negative Negative    Leukocyte esterase (UA POC) 1+ Negative     No results for input(s): 48 in the last 72 hours. Assessment and Plan:   Left upper ureteral calculus 8 mm in size, persistence of large amount of stone despite breakage partially, leukocytes in the urine recommend culture and sensitivity; recommend repeat ESWL of this calculus next week when lithotripsy equipment is here. Patient agrees      Ms. Rossy Arteaga has a reminder for a \"due or due soon\" health maintenance. I have asked that she contact her primary care provider for follow-up on this health maintenance. Yanira Andino M.D.  10/16/2020

## 2020-10-16 NOTE — LETTER
10/16/20 Patient: Tata Mathews YOB: 1945 Date of Visit: 10/16/2020 Pamela Vallejo, [address] [address] VIA Facsimile: [phone] Dear Pamela Vallejo DO, Thank you for referring Ms. Tere Little to [address] [place] [address] [facility] for evaluation. My notes for this consultation are attached. If you have questions, please do not hesitate to call me. I look forward to following your patient along with you. Sincerely, Tanja Nova MD

## 2020-10-21 ENCOUNTER — APPOINTMENT (OUTPATIENT)
Dept: GENERAL RADIOLOGY | Age: 75
End: 2020-10-21
Attending: UROLOGY
Payer: MEDICARE

## 2020-10-21 ENCOUNTER — ANESTHESIA (OUTPATIENT)
Dept: SURGERY | Age: 75
End: 2020-10-21
Payer: MEDICARE

## 2020-10-21 ENCOUNTER — ANESTHESIA EVENT (OUTPATIENT)
Dept: SURGERY | Age: 75
End: 2020-10-21
Payer: MEDICARE

## 2020-10-21 ENCOUNTER — HOSPITAL ENCOUNTER (OUTPATIENT)
Age: 75
Discharge: HOME | End: 2020-10-21
Attending: UROLOGY | Admitting: UROLOGY
Payer: MEDICARE

## 2020-10-21 VITALS
HEART RATE: 74 BPM | SYSTOLIC BLOOD PRESSURE: 144 MMHG | TEMPERATURE: 97.4 F | DIASTOLIC BLOOD PRESSURE: 83 MMHG | RESPIRATION RATE: 16 BRPM | HEIGHT: 65 IN | WEIGHT: 164 LBS | BODY MASS INDEX: 27.32 KG/M2 | OXYGEN SATURATION: 100 %

## 2020-10-21 LAB
APTT PPP: 25.3 SEC (ref 23–35.7)
INR PPP: 0.9 (ref 0.9–1.1)
PROTHROMBIN TIME: 12.4 SEC (ref 11.9–14.7)
THERAPEUTIC RANGE,PTTT: NORMAL SEC (ref 68–109)

## 2020-10-21 PROCEDURE — 74019 RADEX ABDOMEN 2 VIEWS: CPT

## 2020-10-21 PROCEDURE — 74011250636 HC RX REV CODE- 250/636: Performed by: UROLOGY

## 2020-10-21 PROCEDURE — 74011000250 HC RX REV CODE- 250: Performed by: NURSE ANESTHETIST, CERTIFIED REGISTERED

## 2020-10-21 PROCEDURE — 2709999900 HC NON-CHARGEABLE SUPPLY: Performed by: UROLOGY

## 2020-10-21 PROCEDURE — 76010000161 HC OR TIME 1 TO 1.5 HR INTENSV-TIER 1: Performed by: UROLOGY

## 2020-10-21 PROCEDURE — 85610 PROTHROMBIN TIME: CPT

## 2020-10-21 PROCEDURE — 36415 COLL VENOUS BLD VENIPUNCTURE: CPT

## 2020-10-21 PROCEDURE — 76210000021 HC REC RM PH II 0.5 TO 1 HR: Performed by: UROLOGY

## 2020-10-21 PROCEDURE — 74011250636 HC RX REV CODE- 250/636: Performed by: NURSE ANESTHETIST, CERTIFIED REGISTERED

## 2020-10-21 PROCEDURE — 76060000033 HC ANESTHESIA 1 TO 1.5 HR: Performed by: UROLOGY

## 2020-10-21 PROCEDURE — 85730 THROMBOPLASTIN TIME PARTIAL: CPT

## 2020-10-21 PROCEDURE — 74011250637 HC RX REV CODE- 250/637: Performed by: UROLOGY

## 2020-10-21 PROCEDURE — 76210000006 HC OR PH I REC 0.5 TO 1 HR: Performed by: UROLOGY

## 2020-10-21 PROCEDURE — 74011250636 HC RX REV CODE- 250/636: Performed by: ANESTHESIOLOGY

## 2020-10-21 RX ORDER — SODIUM CHLORIDE 0.9 % (FLUSH) 0.9 %
5-40 SYRINGE (ML) INJECTION AS NEEDED
Status: DISCONTINUED | OUTPATIENT
Start: 2020-10-21 | End: 2020-10-21 | Stop reason: HOSPADM

## 2020-10-21 RX ORDER — DIPHENHYDRAMINE HYDROCHLORIDE 50 MG/ML
12.5 INJECTION, SOLUTION INTRAMUSCULAR; INTRAVENOUS AS NEEDED
Status: DISCONTINUED | OUTPATIENT
Start: 2020-10-21 | End: 2020-10-21 | Stop reason: HOSPADM

## 2020-10-21 RX ORDER — FENTANYL CITRATE 50 UG/ML
50 INJECTION, SOLUTION INTRAMUSCULAR; INTRAVENOUS
Status: DISCONTINUED | OUTPATIENT
Start: 2020-10-21 | End: 2020-10-21 | Stop reason: HOSPADM

## 2020-10-21 RX ORDER — MORPHINE SULFATE 10 MG/ML
2 INJECTION, SOLUTION INTRAMUSCULAR; INTRAVENOUS
Status: DISCONTINUED | OUTPATIENT
Start: 2020-10-21 | End: 2020-10-21 | Stop reason: HOSPADM

## 2020-10-21 RX ORDER — MIDAZOLAM HYDROCHLORIDE 1 MG/ML
0.5 INJECTION, SOLUTION INTRAMUSCULAR; INTRAVENOUS
Status: DISCONTINUED | OUTPATIENT
Start: 2020-10-21 | End: 2020-10-21 | Stop reason: HOSPADM

## 2020-10-21 RX ORDER — MIDAZOLAM HYDROCHLORIDE 1 MG/ML
1 INJECTION, SOLUTION INTRAMUSCULAR; INTRAVENOUS AS NEEDED
Status: DISCONTINUED | OUTPATIENT
Start: 2020-10-21 | End: 2020-10-21 | Stop reason: HOSPADM

## 2020-10-21 RX ORDER — ONDANSETRON 2 MG/ML
4 INJECTION INTRAMUSCULAR; INTRAVENOUS AS NEEDED
Status: DISCONTINUED | OUTPATIENT
Start: 2020-10-21 | End: 2020-10-21 | Stop reason: HOSPADM

## 2020-10-21 RX ORDER — SODIUM CHLORIDE 0.9 % (FLUSH) 0.9 %
5-40 SYRINGE (ML) INJECTION EVERY 8 HOURS
Status: DISCONTINUED | OUTPATIENT
Start: 2020-10-21 | End: 2020-10-21 | Stop reason: HOSPADM

## 2020-10-21 RX ORDER — LIDOCAINE HYDROCHLORIDE 20 MG/ML
INJECTION, SOLUTION EPIDURAL; INFILTRATION; INTRACAUDAL; PERINEURAL AS NEEDED
Status: DISCONTINUED | OUTPATIENT
Start: 2020-10-21 | End: 2020-10-21 | Stop reason: HOSPADM

## 2020-10-21 RX ORDER — LIDOCAINE HYDROCHLORIDE 10 MG/ML
0.1 INJECTION, SOLUTION EPIDURAL; INFILTRATION; INTRACAUDAL; PERINEURAL AS NEEDED
Status: DISCONTINUED | OUTPATIENT
Start: 2020-10-21 | End: 2020-10-21 | Stop reason: HOSPADM

## 2020-10-21 RX ORDER — SODIUM CHLORIDE, SODIUM LACTATE, POTASSIUM CHLORIDE, CALCIUM CHLORIDE 600; 310; 30; 20 MG/100ML; MG/100ML; MG/100ML; MG/100ML
INJECTION, SOLUTION INTRAVENOUS
Status: DISCONTINUED | OUTPATIENT
Start: 2020-10-21 | End: 2020-10-21 | Stop reason: HOSPADM

## 2020-10-21 RX ORDER — NORETHINDRONE AND ETHINYL ESTRADIOL 0.5-0.035
5 KIT ORAL AS NEEDED
Status: DISCONTINUED | OUTPATIENT
Start: 2020-10-21 | End: 2020-10-21 | Stop reason: HOSPADM

## 2020-10-21 RX ORDER — FENTANYL CITRATE 50 UG/ML
50 INJECTION, SOLUTION INTRAMUSCULAR; INTRAVENOUS AS NEEDED
Status: DISCONTINUED | OUTPATIENT
Start: 2020-10-21 | End: 2020-10-21 | Stop reason: HOSPADM

## 2020-10-21 RX ORDER — GRANULES FOR ORAL 3 G/1
3 POWDER ORAL ONCE
Status: COMPLETED | OUTPATIENT
Start: 2020-10-21 | End: 2020-10-21

## 2020-10-21 RX ORDER — SODIUM CHLORIDE, SODIUM LACTATE, POTASSIUM CHLORIDE, CALCIUM CHLORIDE 600; 310; 30; 20 MG/100ML; MG/100ML; MG/100ML; MG/100ML
20 INJECTION, SOLUTION INTRAVENOUS CONTINUOUS
Status: DISCONTINUED | OUTPATIENT
Start: 2020-10-21 | End: 2020-10-21 | Stop reason: HOSPADM

## 2020-10-21 RX ORDER — PROPOFOL 10 MG/ML
INJECTION, EMULSION INTRAVENOUS AS NEEDED
Status: DISCONTINUED | OUTPATIENT
Start: 2020-10-21 | End: 2020-10-21 | Stop reason: HOSPADM

## 2020-10-21 RX ORDER — ONDANSETRON 2 MG/ML
INJECTION INTRAMUSCULAR; INTRAVENOUS AS NEEDED
Status: DISCONTINUED | OUTPATIENT
Start: 2020-10-21 | End: 2020-10-21 | Stop reason: HOSPADM

## 2020-10-21 RX ORDER — EPHEDRINE SULFATE/0.9% NACL/PF 50 MG/5 ML
SYRINGE (ML) INTRAVENOUS AS NEEDED
Status: DISCONTINUED | OUTPATIENT
Start: 2020-10-21 | End: 2020-10-21 | Stop reason: HOSPADM

## 2020-10-21 RX ADMIN — FOSFOMYCIN TROMETHAMINE 3 G: 3 POWDER ORAL at 14:10

## 2020-10-21 RX ADMIN — SODIUM CHLORIDE, POTASSIUM CHLORIDE, SODIUM LACTATE AND CALCIUM CHLORIDE 20 ML/HR: 600; 310; 30; 20 INJECTION, SOLUTION INTRAVENOUS at 10:29

## 2020-10-21 RX ADMIN — SODIUM CHLORIDE, POTASSIUM CHLORIDE, SODIUM LACTATE AND CALCIUM CHLORIDE: 600; 310; 30; 20 INJECTION, SOLUTION INTRAVENOUS at 11:24

## 2020-10-21 RX ADMIN — ONDANSETRON 4 MG: 2 INJECTION INTRAMUSCULAR; INTRAVENOUS at 11:45

## 2020-10-21 RX ADMIN — MIDAZOLAM HYDROCHLORIDE 1 MG: 2 INJECTION, SOLUTION INTRAMUSCULAR; INTRAVENOUS at 11:24

## 2020-10-21 RX ADMIN — Medication 10 MG: at 11:55

## 2020-10-21 RX ADMIN — Medication 10 MG: at 11:50

## 2020-10-21 RX ADMIN — PROPOFOL 150 MG: 10 INJECTION, EMULSION INTRAVENOUS at 11:34

## 2020-10-21 RX ADMIN — LIDOCAINE HYDROCHLORIDE 40 MG: 20 INJECTION, SOLUTION EPIDURAL; INFILTRATION; INTRACAUDAL; PERINEURAL at 11:34

## 2020-10-21 NOTE — PROGRESS NOTES
Discharged patient, gave discharge education and made follow up appointment. Patient given strainer and cup. Patient verbalized discharge education, and daughter Vidal Hoffmann was present for discharge education with patient's approval. Patient left building in wheelchair.

## 2020-10-21 NOTE — ANESTHESIA POSTPROCEDURE EVALUATION
Procedure(s):  Left Ureteral Extracorporeal Shockwave Lithotripsy.     general    Anesthesia Post Evaluation      Multimodal analgesia: multimodal analgesia not used between 6 hours prior to anesthesia start to PACU discharge  Patient location during evaluation: PACU  Patient participation: complete - patient participated  Level of consciousness: awake and alert  Pain score: 1  Pain management: adequate  Airway patency: patent  Anesthetic complications: no  Cardiovascular status: hemodynamically stable and acceptable  Respiratory status: acceptable, nonlabored ventilation, spontaneous ventilation and room air  Hydration status: acceptable  Post anesthesia nausea and vomiting:  none  Final Post Anesthesia Temperature Assessment:  Normothermia (36.0-37.5 degrees C)      INITIAL Post-op Vital signs:   Vitals Value Taken Time   /62 10/21/2020  1:00 PM   Temp 36.2 °C (97.2 °F) 10/21/2020  1:00 PM   Pulse 81 10/21/2020  1:00 PM   Resp 15 10/21/2020  1:00 PM   SpO2 100 % 10/21/2020  1:00 PM

## 2020-10-21 NOTE — BRIEF OP NOTE
Brief Postoperative Note    Patient: Teri Reece  YOB: 1945  MRN: [id number]    Date of Procedure: 10/21/2020     Pre-Op Diagnosis: Calculus of ureter [N20.1]    Post-Op Diagnosis: Same as preoperative diagnosis.       Procedure(s):  Left Ureteral Extracorporeal Shockwave Lithotripsy    Surgeon(s):  Mary Polanco MD    Surgical Assistant: None    Anesthesia: General     Estimated Blood Loss (mL): Minimal    Complications: None    Specimens: none      Implants: none    Drains:     Findings: Left upper ureteral calculus, apparent good pulverization    Electronically Signed by Jeannette Jacques MD on 10/21/2020 at 12:28 PM;  Jr.MD Agustín

## 2020-10-21 NOTE — ANESTHESIA PREPROCEDURE EVALUATION
Relevant Problems   No relevant active problems       Anesthetic History   No history of anesthetic complications            Review of Systems / Medical History  Patient summary reviewed, nursing notes reviewed and pertinent labs reviewed    Pulmonary            Asthma        Neuro/Psych   Within defined limits           Cardiovascular    Hypertension          Hyperlipidemia         GI/Hepatic/Renal                Endo/Other      Hypothyroidism  Arthritis     Other Findings   Comments: Hematuria  Kidney  stones         Physical Exam    Airway  Mallampati: II  TM Distance: > 6 cm  Neck ROM: normal range of motion   Mouth opening: Normal     Cardiovascular    Rhythm: regular  Rate: normal         Dental         Pulmonary      Decreased breath sounds           Abdominal         Other Findings            Anesthetic Plan    ASA: 2  Anesthesia type: general          Induction: Intravenous  Anesthetic plan and risks discussed with: Patient and Son / Daughter

## 2020-10-22 NOTE — OP NOTES
[facility]  OPERATIVE REPORT    Name:  Nina Aparicio  MR#:  [id number]  :  1945  ACCOUNT #:  [de-identified]  DATE OF SERVICE:  10/21/2020    HISTORY:  This is a 29-year-old lady with a left upper ureteral calculus 8-mm in size. She had a previous treatment when the stone was 9 mm, passed some small fragments, but had persistence of the calculus on x-ray done in followup. PREOPERATIVE DIAGNOSIS:  Left ureteral calculus. POSTOPERATIVE DIAGNOSIS:  Left ureteral calculus. PROCEDURE PERFORMED:  She is undergoing repeat (ESWL) extracorporeal shock-wave lithotripsy of the stone. SURGEON:  Delio Desai MD    ASSISTANT:  KATIUSKA Sawant ANESTHESIA:  General.    COMPLICATIONS:  None. SPECIMENS REMOVED:  None. DRAINS:  None. IMPLANTS:  None. ESTIMATED BLOOD LOSS:  Minimal.    PROCEDURE:  With the patient in the supine position, under satisfactory general anesthesia, the lithotripsy machine was aimed at the stone by x-ray control and fluoroscopy. 3000 shocks were administered up to a power of 7. At the close of the procedure, pulverization appeared to have taken place with the appearance of the stone changing on the fluoroscopy. Whether this was a complete enough fragmenting will have to be ascertained with a conventional x-ray. The patient tolerated the procedure well and left the operating room in good condition.       MD ABBI Pearl/[email or web address]  D:  10/21/2020 13:11  T:  10/21/2020 20:32  JOB #:  [id number]

## 2020-10-24 LAB
BACTERIA UR CULT: NO GROWTH
BLOOD BANK COMMENT,BBC: NORMAL
CALCIUM OXALATE DIHYDRATE MFR STONE IR: 90 %
COLOR STONE: NORMAL
COM MFR STONE: 10 %
COMPOSITION, 64793: NORMAL
Lab: NORMAL
Lab: NORMAL
PHOTO, 120675: NORMAL
SIZE STONE: NORMAL MM
SOURCE: NORMAL
WT STONE: 75 MG

## 2020-11-10 ENCOUNTER — OFFICE VISIT (OUTPATIENT)
Dept: UROLOGY | Age: 75
End: 2020-11-10
Payer: MEDICARE

## 2020-11-10 ENCOUNTER — HOSPITAL ENCOUNTER (OUTPATIENT)
Dept: GENERAL RADIOLOGY | Age: 75
Discharge: HOME | End: 2020-11-10
Payer: MEDICARE

## 2020-11-10 VITALS
SYSTOLIC BLOOD PRESSURE: 117 MMHG | WEIGHT: 164 LBS | TEMPERATURE: 98.8 F | BODY MASS INDEX: 27.32 KG/M2 | DIASTOLIC BLOOD PRESSURE: 72 MMHG | HEIGHT: 65 IN

## 2020-11-10 DIAGNOSIS — N20.0 CALCULUS OF KIDNEY: ICD-10-CM

## 2020-11-10 DIAGNOSIS — R35.1 NOCTURIA: ICD-10-CM

## 2020-11-10 DIAGNOSIS — N20.0 KIDNEY STONE: ICD-10-CM

## 2020-11-10 DIAGNOSIS — N20.1 URETERIC STONE: ICD-10-CM

## 2020-11-10 DIAGNOSIS — N35.92 STRICTURE OF FEMALE URETHRA, UNSPECIFIED STRICTURE TYPE: ICD-10-CM

## 2020-11-10 DIAGNOSIS — N20.0 KIDNEY STONE: Primary | ICD-10-CM

## 2020-11-10 DIAGNOSIS — R31.29 MICROSCOPIC HEMATURIA: Primary | ICD-10-CM

## 2020-11-10 LAB
BILIRUB UR QL STRIP: NEGATIVE
GLUCOSE UR-MCNC: NEGATIVE MG/DL
KETONES P FAST UR STRIP-MCNC: NEGATIVE MG/DL
PH UR STRIP: 6 [PH] (ref 4.6–8)
PROT UR QL STRIP: NEGATIVE
SP GR UR STRIP: 1.02 (ref 1–1.03)
UA UROBILINOGEN AMB POC: NORMAL (ref 0.2–1)
URINALYSIS CLARITY POC: CLEAR
URINALYSIS COLOR POC: YELLOW
URINE BLOOD POC: NORMAL
URINE LEUKOCYTES POC: NORMAL
URINE NITRITES POC: NEGATIVE

## 2020-11-10 PROCEDURE — 81003 URINALYSIS AUTO W/O SCOPE: CPT | Performed by: UROLOGY

## 2020-11-10 PROCEDURE — 74019 RADEX ABDOMEN 2 VIEWS: CPT

## 2020-11-10 PROCEDURE — 99024 POSTOP FOLLOW-UP VISIT: CPT | Performed by: UROLOGY

## 2020-11-10 NOTE — PROGRESS NOTES
HPI ROS PE NOTE          History of Present Illness   Chief complaint follow-up ESWL left ureteral calculus  Aldo Casiano is a 67 y.o. female who presents with follow-up from ESWL of left upper ureteral calculus second treatment performed on 10/21/2020; unwell after this procedure has passed significant number of stone fragments and is not suffering significant ureteral colic. .. Past Medical History:   Diagnosis Date    Arthritis     Asthma     Hypercholesterolemia     Hypertension     Kidney stone 7/23/2020    Long term current use of anticoagulant therapy     asa 81mg qd    Microscopic hematuria 7/23/2020    Shoulder fracture, right     NO SURGERY    Thyroid disease     Ureteric stone 7/23/2020    Urethral stricture 7/23/2020    UTI (urinary tract infection) 7/23/2020      Past Surgical History:   Procedure Laterality Date    HX COLONOSCOPY      HX HYSTERECTOMY      HX LITHOTRIPSY      HX LITHOTRIPSY  10/21/2020    HX REFRACTIVE SURGERY Bilateral     CATARACT REMOVAL    HX THYROIDECTOMY      HX TURP      cystoscopy    HX UROLOGICAL       Family History   Problem Relation Age of Onset    Alzheimer Mother     Heart Disease Father       Social History     Tobacco Use    Smoking status: Never Smoker    Smokeless tobacco: Never Used   Substance Use Topics    Alcohol use: Never     Frequency: Never       Prior to Admission medications    Medication Sig Start Date End Date Taking? Authorizing Provider   aspirin delayed-release 81 mg tablet Take  by mouth daily. Yes Provider, Historical   atorvastatin (LIPITOR) 10 mg tablet Take  by mouth daily. Yes Provider, Historical   vitamin B complex (B COMPLETE PO) Take  by mouth. Yes Provider, Historical   levothyroxine (SYNTHROID) 75 mcg tablet Take  by mouth Daily (before breakfast). Yes Provider, Historical   lisinopriL (PRINIVIL, ZESTRIL) 5 mg tablet Take  by mouth daily.    Yes Provider, Historical   polyethylene glycol (Miralax) 17 gram/dose powder Take 17 g by mouth daily. As needed   Yes Provider, Historical   multivitamin (ONE A DAY) tablet Take 1 Tab by mouth daily. Yes Provider, Historical     Allergies   Allergen Reactions    Advair Diskus [Fluticasone Propion-Salmeterol] Swelling     Swelling to throat    Mycobacterium Tuberculosis (Tuberculin Ppd) Rash    Sulfa (Sulfonamide Antibiotics) Hives and Rash    Cristian-Dur [Theophylline] Other (comments)    Tuberculin Ppd Other (comments)        Review of Systems:  A comprehensive review of systems was negative except for that written in the History of Present Illness. Physical Exam     Physical Exam:   Visit Vitals  /72   Temp 98.8 °F (37.1 °C) (Temporal)   Ht 5' 5\" (1.651 m)   Wt 164 lb (74.4 kg)   BMI 27.29 kg/m²     General:  Alert, cooperative, no distress, appears stated age. Head:  Normocephalic, without obvious abnormality, atraumatic. Eyes:  Conjunctivae/corneas clear. PERRL, EOMs intact. Fundi benign. Ears:  Normal TMs and external ear canals both ears. Nose: Nares normal. Septum midline. Mucosa normal. No drainage or sinus tenderness. Throat: Lips, mucosa, and tongue normal. Teeth and gums normal.   Neck: Supple, symmetrical, trachea midline, no adenopathy, thyroid: no enlargement/tenderness/nodules, no carotid bruit and no JVD. Back:   Symmetric, no curvature. ROM normal. No CVA tenderness. Lungs:   Clear to auscultation bilaterally. Chest wall:  No tenderness or deformity. Heart:  Regular rate and rhythm, S1, S2 normal, no murmur, click, rub or gallop. Breast Exam:  No tenderness, masses, or nipple abnormality. Abdomen:   Soft, non-tender. Bowel sounds normal. No masses,  No organomegaly. Genitalia:  Normal female without lesion, discharge or tenderness. Rectal:  Normal tone,  no masses or tenderness  Guaiac negative stool. Extremities: Extremities normal, atraumatic, no cyanosis or edema. Pulses: 2+ and symmetric all extremities. Skin: Skin color, texture, turgor normal. No rashes or lesions. Lymph nodes: Cervical, supraclavicular, and axillary nodes normal.   Neurologic: CNII-XII intact. Normal strength, sensation and reflexes throughout. Data Review:   Recent Results (from the past 50 hour(s))   AMB POC URINALYSIS DIP STICK AUTO W/O MICRO    Collection Time: 11/10/20  2:54 PM   Result Value Ref Range    Color (UA POC) Yellow     Clarity (UA POC) Clear     Glucose (UA POC) Negative Negative    Bilirubin (UA POC) Negative Negative    Ketones (UA POC) Negative Negative    Specific gravity (UA POC) 1.020 1.001 - 1.035    Blood (UA POC) Trace Negative    pH (UA POC) 6.0 4.6 - 8.0    Protein (UA POC) Negative Negative    Urobilinogen (UA POC) 0.2 mg/dL 0.2 - 1    Nitrites (UA POC) Negative Negative    Leukocyte esterase (UA POC) Trace Negative     No results for input(s): 48 in the last 72 hours. Assessment and Plan:   Left ureteral calculus post ESWL satisfactory fragmentation renal calculi also present no treatment needed and follow-up in 4 to 6 weeks with recheck of KUB to assess washing out of stone fragments      Ms. Orozco Covert has a reminder for a \"due or due soon\" health maintenance. I have asked that she contact her primary care provider for follow-up on this health maintenance. Nila Sullivan M.D.  11/10/2020

## 2020-11-18 LAB
BACTERIA UR CULT: ABNORMAL
BLOOD BANK COMMENT,BBC: NORMAL
CALCIUM OXALATE DIHYDRATE MFR STONE IR: 80 %
COLOR STONE: NORMAL
COM MFR STONE: 15 %
COMPOSITION, 64793: NORMAL
HYDROXYAPATITE: 5 %
Lab: NORMAL
Lab: NORMAL
PHOTO, 120675: NORMAL
SIZE STONE: NORMAL MM
SOURCE: NORMAL
WT STONE: 287 MG

## 2020-12-07 DIAGNOSIS — N20.0 KIDNEY STONE: Primary | ICD-10-CM

## 2020-12-15 ENCOUNTER — HOSPITAL ENCOUNTER (OUTPATIENT)
Dept: GENERAL RADIOLOGY | Age: 75
Discharge: HOME | End: 2020-12-15
Payer: MEDICARE

## 2020-12-15 ENCOUNTER — OFFICE VISIT (OUTPATIENT)
Dept: UROLOGY | Age: 75
End: 2020-12-15
Payer: MEDICARE

## 2020-12-15 VITALS — HEIGHT: 65 IN | TEMPERATURE: 97.5 F | BODY MASS INDEX: 26.16 KG/M2 | WEIGHT: 157 LBS

## 2020-12-15 DIAGNOSIS — N20.0 KIDNEY STONE: ICD-10-CM

## 2020-12-15 DIAGNOSIS — R31.9 URINARY TRACT INFECTION WITH HEMATURIA, SITE UNSPECIFIED: Primary | ICD-10-CM

## 2020-12-15 DIAGNOSIS — N39.0 URINARY TRACT INFECTION WITH HEMATURIA, SITE UNSPECIFIED: Primary | ICD-10-CM

## 2020-12-15 PROCEDURE — 99024 POSTOP FOLLOW-UP VISIT: CPT | Performed by: UROLOGY

## 2020-12-15 PROCEDURE — 81003 URINALYSIS AUTO W/O SCOPE: CPT | Performed by: UROLOGY

## 2020-12-15 PROCEDURE — 74019 RADEX ABDOMEN 2 VIEWS: CPT

## 2020-12-15 NOTE — PROGRESS NOTES
HPI ROS PE NOTE          History of Present Illness   Chief complaint; follow-up ESWL of left ureteropelvic junction calculus 10/21/2020  Darion Gan is a 67 y.o. female who presents with follow-up lithotripsy, KUB today shows resolution of the original calculus with a few small fragments in the kidney, no complaints. Previously had recurrent urinary tract infection, attention directed to office note 11/10/2020  Past Medical History:   Diagnosis Date    Arthritis     Asthma     Hypercholesterolemia     Hypertension     Kidney stone 7/23/2020    Long term current use of anticoagulant therapy     asa 81mg qd    Microscopic hematuria 7/23/2020    Shoulder fracture, right     NO SURGERY    Thyroid disease     Ureteric stone 7/23/2020    Urethral stricture 7/23/2020    UTI (urinary tract infection) 7/23/2020      Past Surgical History:   Procedure Laterality Date    HX COLONOSCOPY      HX HYSTERECTOMY      HX LITHOTRIPSY      HX LITHOTRIPSY  10/21/2020    HX REFRACTIVE SURGERY Bilateral     CATARACT REMOVAL    HX THYROIDECTOMY      HX TURP      cystoscopy    HX UROLOGICAL       Family History   Problem Relation Age of Onset    Alzheimer Mother     Heart Disease Father     Cancer Sister     Cancer Brother     Cancer Brother       Social History     Tobacco Use    Smoking status: Never Smoker    Smokeless tobacco: Never Used   Substance Use Topics    Alcohol use: Not Currently     Frequency: Never       Prior to Admission medications    Medication Sig Start Date End Date Taking? Authorizing Provider   aspirin delayed-release 81 mg tablet Take  by mouth daily. Yes Provider, Historical   atorvastatin (LIPITOR) 10 mg tablet Take  by mouth daily. Yes Provider, Historical   vitamin B complex (B COMPLETE PO) Take  by mouth. Yes Provider, Historical   levothyroxine (SYNTHROID) 75 mcg tablet Take  by mouth Daily (before breakfast).    Yes Provider, Historical   lisinopriL (PRINIVIL, ZESTRIL) 5 mg tablet Take  by mouth daily. Yes Provider, Historical   polyethylene glycol (Miralax) 17 gram/dose powder Take 17 g by mouth daily. As needed   Yes Provider, Historical   multivitamin (ONE A DAY) tablet Take 1 Tab by mouth daily. Yes Provider, Historical     Allergies   Allergen Reactions    Advair Diskus [Fluticasone Propion-Salmeterol] Swelling     Swelling to throat    Mycobacterium Tuberculosis (Tuberculin Ppd) Rash    Cristian-Dur [Theophylline] Other (comments)    Sulfa (Sulfonamide Antibiotics) Hives and Rash    Tuberculin Ppd Other (comments) and Rash        Review of Systems:  A comprehensive review of systems was negative except for that written in the History of Present Illness. Physical Exam     Physical Exam:   No exam performed today, examination necessary. Data Review:   Recent Results (from the past 50 hour(s))   AMB POC URINALYSIS DIP STICK AUTO W/O MICRO    Collection Time: 12/15/20 10:21 AM   Result Value Ref Range    Color (UA POC) Yellow     Clarity (UA POC) Clear     Glucose (UA POC) Negative Negative    Bilirubin (UA POC) Negative Negative    Ketones (UA POC) Negative Negative    Specific gravity (UA POC) 1.020 1.001 - 1.035    Blood (UA POC) Trace Negative    pH (UA POC) 6.0 4.6 - 8.0    Protein (UA POC) Negative Negative    Urobilinogen (UA POC) 0.2 mg/dL 0.2 - 1    Nitrites (UA POC) Negative Negative    Leukocyte esterase (UA POC)       No results for input(s): 48 in the last 72 hours. Assessment and Plan:   Status post lithotripsy, left upper ureteral UPJ calculus, stone fully resolved. Several small stones still in renal pelvis all passable size; follow-up visit 6 months      Ms. Van has a reminder for a \"due or due soon\" health maintenance. I have asked that she contact her primary care provider for follow-up on this health maintenance. Chacho Brooke M.D.  12/15/2020

## 2020-12-15 NOTE — PROGRESS NOTES
Patient has complete resolution of earlier large ureteropelvic junction left sided calculus; neurologist did not compare the x-ray to the October film in which the large stone was present; currentx-ray similar to 1 month ago

## 2020-12-17 LAB
APPEARANCE UR: CLEAR
BACTERIA #/AREA URNS HPF: ABNORMAL /[HPF]
BACTERIA UR CULT: NORMAL
BILIRUB UR QL STRIP: NEGATIVE
CASTS URNS QL MICRO: ABNORMAL /LPF
COLOR UR: YELLOW
EPI CELLS #/AREA URNS HPF: ABNORMAL /HPF (ref 0–10)
GLUCOSE UR QL: NEGATIVE
HGB UR QL STRIP: NEGATIVE
KETONES UR QL STRIP: NEGATIVE
LEUKOCYTE ESTERASE UR QL STRIP: ABNORMAL
MICRO URNS: ABNORMAL
MUCOUS THREADS URNS QL MICRO: PRESENT
NITRITE UR QL STRIP: NEGATIVE
PH UR STRIP: 6.5 [PH] (ref 5–7.5)
PROT UR QL STRIP: NEGATIVE
RBC #/AREA URNS HPF: ABNORMAL /HPF (ref 0–2)
SP GR UR: 1.01 (ref 1–1.03)
UROBILINOGEN UR STRIP-MCNC: 0.2 MG/DL (ref 0.2–1)
WBC #/AREA URNS HPF: ABNORMAL /HPF (ref 0–5)

## 2021-08-03 PROBLEM — N20.0 KIDNEY STONE: Status: RESOLVED | Noted: 2020-07-23 | Resolved: 2021-08-03

## 2021-08-23 ENCOUNTER — TRANSCRIBE ORDER (OUTPATIENT)
Dept: SCHEDULING | Age: 76
End: 2021-08-23

## 2021-08-23 DIAGNOSIS — Z12.31 ENCOUNTER FOR SCREENING MAMMOGRAM FOR MALIGNANT NEOPLASM OF BREAST: Primary | ICD-10-CM

## 2021-08-30 ENCOUNTER — HOSPITAL ENCOUNTER (OUTPATIENT)
Dept: MAMMOGRAPHY | Age: 76
Discharge: HOME | End: 2021-08-30
Attending: SURGERY
Payer: MEDICARE

## 2021-08-30 DIAGNOSIS — Z12.31 ENCOUNTER FOR SCREENING MAMMOGRAM FOR MALIGNANT NEOPLASM OF BREAST: ICD-10-CM

## 2021-08-30 PROCEDURE — 77063 BREAST TOMOSYNTHESIS BI: CPT

## 2021-12-18 ENCOUNTER — APPOINTMENT (OUTPATIENT)
Dept: CT IMAGING | Age: 76
End: 2021-12-18
Attending: PHYSICIAN ASSISTANT
Payer: MEDICARE

## 2021-12-18 ENCOUNTER — HOSPITAL ENCOUNTER (EMERGENCY)
Age: 76
Discharge: ANOTHER HOSPITAL | End: 2021-12-19
Attending: STUDENT IN AN ORGANIZED HEALTH CARE EDUCATION/TRAINING PROGRAM
Payer: MEDICARE

## 2021-12-18 DIAGNOSIS — N20.1 URETEROLITHIASIS: Primary | ICD-10-CM

## 2021-12-18 DIAGNOSIS — N13.5 OBSTRUCTION OF RIGHT URETER: ICD-10-CM

## 2021-12-18 DIAGNOSIS — N13.30 HYDRONEPHROSIS, RIGHT: ICD-10-CM

## 2021-12-18 LAB
ALBUMIN SERPL-MCNC: 3.5 G/DL (ref 3.5–5)
ALBUMIN/GLOB SERPL: 1.1 {RATIO} (ref 1.1–2.2)
ALP SERPL-CCNC: 52 U/L (ref 45–117)
ALT SERPL-CCNC: 29 U/L (ref 12–78)
ANION GAP SERPL CALC-SCNC: 6 MMOL/L (ref 5–15)
APPEARANCE UR: ABNORMAL
AST SERPL W P-5'-P-CCNC: 20 U/L (ref 15–37)
BACTERIA URNS QL MICRO: NEGATIVE /HPF
BASOPHILS # BLD: 0 K/UL (ref 0–0.1)
BASOPHILS NFR BLD: 0 % (ref 0–1)
BILIRUB SERPL-MCNC: 0.3 MG/DL (ref 0.2–1)
BILIRUB UR QL: NEGATIVE
BUN SERPL-MCNC: 18 MG/DL (ref 6–20)
BUN/CREAT SERPL: 22 (ref 12–20)
CA-I BLD-MCNC: 9.6 MG/DL (ref 8.5–10.1)
CHLORIDE SERPL-SCNC: 111 MMOL/L (ref 97–108)
CO2 SERPL-SCNC: 26 MMOL/L (ref 21–32)
COLOR UR: ABNORMAL
CREAT SERPL-MCNC: 0.82 MG/DL (ref 0.55–1.02)
DIFFERENTIAL METHOD BLD: NORMAL
EOSINOPHIL # BLD: 0.2 K/UL (ref 0–0.4)
EOSINOPHIL NFR BLD: 2 % (ref 0–7)
ERYTHROCYTE [DISTWIDTH] IN BLOOD BY AUTOMATED COUNT: 12.6 % (ref 11.5–14.5)
GLOBULIN SER CALC-MCNC: 3.1 G/DL (ref 2–4)
GLUCOSE SERPL-MCNC: 124 MG/DL (ref 65–100)
GLUCOSE UR STRIP.AUTO-MCNC: NEGATIVE MG/DL
HCT VFR BLD AUTO: 41.9 % (ref 35–47)
HGB BLD-MCNC: 13.5 G/DL (ref 11.5–16)
HGB UR QL STRIP: ABNORMAL
IMM GRANULOCYTES # BLD AUTO: 0 K/UL (ref 0–0.04)
IMM GRANULOCYTES NFR BLD AUTO: 0 % (ref 0–0.5)
KETONES UR QL STRIP.AUTO: NEGATIVE MG/DL
LEUKOCYTE ESTERASE UR QL STRIP.AUTO: ABNORMAL
LYMPHOCYTES # BLD: 1.5 K/UL (ref 0.8–3.5)
LYMPHOCYTES NFR BLD: 18 % (ref 12–49)
MCH RBC QN AUTO: 29.4 PG (ref 26–34)
MCHC RBC AUTO-ENTMCNC: 32.2 G/DL (ref 30–36.5)
MCV RBC AUTO: 91.3 FL (ref 80–99)
MONOCYTES # BLD: 0.5 K/UL (ref 0–1)
MONOCYTES NFR BLD: 6 % (ref 5–13)
MUCOUS THREADS URNS QL MICRO: ABNORMAL /LPF
NEUTS SEG # BLD: 6 K/UL (ref 1.8–8)
NEUTS SEG NFR BLD: 74 % (ref 32–75)
NITRITE UR QL STRIP.AUTO: NEGATIVE
NRBC # BLD: 0 K/UL (ref 0–0.01)
NRBC BLD-RTO: 0 PER 100 WBC
PH UR STRIP: 5 [PH] (ref 5–8)
PLATELET # BLD AUTO: 196 K/UL (ref 150–400)
PMV BLD AUTO: 10.6 FL (ref 8.9–12.9)
POTASSIUM SERPL-SCNC: 4.1 MMOL/L (ref 3.5–5.1)
PROT SERPL-MCNC: 6.6 G/DL (ref 6.4–8.2)
PROT UR STRIP-MCNC: 30 MG/DL
RBC # BLD AUTO: 4.59 M/UL (ref 3.8–5.2)
RBC #/AREA URNS HPF: ABNORMAL /HPF (ref 0–5)
SODIUM SERPL-SCNC: 143 MMOL/L (ref 136–145)
SP GR UR REFRACTOMETRY: 1.02 (ref 1–1.03)
TROPONIN-HIGH SENSITIVITY: 12 NG/L (ref 0–51)
UROBILINOGEN UR QL STRIP.AUTO: 0.1 EU/DL (ref 0.1–1)
WBC # BLD AUTO: 8.2 K/UL (ref 3.6–11)
WBC URNS QL MICRO: ABNORMAL /HPF (ref 0–4)

## 2021-12-18 PROCEDURE — 99285 EMERGENCY DEPT VISIT HI MDM: CPT

## 2021-12-18 PROCEDURE — 96375 TX/PRO/DX INJ NEW DRUG ADDON: CPT

## 2021-12-18 PROCEDURE — 85025 COMPLETE CBC W/AUTO DIFF WBC: CPT

## 2021-12-18 PROCEDURE — 36415 COLL VENOUS BLD VENIPUNCTURE: CPT

## 2021-12-18 PROCEDURE — 81001 URINALYSIS AUTO W/SCOPE: CPT

## 2021-12-18 PROCEDURE — 96374 THER/PROPH/DIAG INJ IV PUSH: CPT

## 2021-12-18 PROCEDURE — 84484 ASSAY OF TROPONIN QUANT: CPT

## 2021-12-18 PROCEDURE — 93005 ELECTROCARDIOGRAM TRACING: CPT

## 2021-12-18 PROCEDURE — 80053 COMPREHEN METABOLIC PANEL: CPT

## 2021-12-18 PROCEDURE — 74011250636 HC RX REV CODE- 250/636: Performed by: PHYSICIAN ASSISTANT

## 2021-12-18 RX ORDER — ONDANSETRON 2 MG/ML
4 INJECTION INTRAMUSCULAR; INTRAVENOUS
Status: COMPLETED | OUTPATIENT
Start: 2021-12-18 | End: 2021-12-18

## 2021-12-18 RX ORDER — MORPHINE SULFATE 2 MG/ML
2 INJECTION, SOLUTION INTRAMUSCULAR; INTRAVENOUS ONCE
Status: COMPLETED | OUTPATIENT
Start: 2021-12-18 | End: 2021-12-18

## 2021-12-18 RX ADMIN — MORPHINE SULFATE 2 MG: 2 INJECTION, SOLUTION INTRAMUSCULAR; INTRAVENOUS at 23:11

## 2021-12-18 RX ADMIN — ONDANSETRON 4 MG: 2 INJECTION INTRAMUSCULAR; INTRAVENOUS at 23:11

## 2021-12-19 ENCOUNTER — HOSPITAL ENCOUNTER (INPATIENT)
Age: 76
LOS: 2 days | Discharge: HOME | DRG: 661 | End: 2021-12-21
Attending: FAMILY MEDICINE | Admitting: HOSPITALIST
Payer: MEDICARE

## 2021-12-19 ENCOUNTER — APPOINTMENT (OUTPATIENT)
Dept: CT IMAGING | Age: 76
End: 2021-12-19
Attending: PHYSICIAN ASSISTANT
Payer: MEDICARE

## 2021-12-19 VITALS
SYSTOLIC BLOOD PRESSURE: 139 MMHG | BODY MASS INDEX: 29.32 KG/M2 | RESPIRATION RATE: 18 BRPM | TEMPERATURE: 98.2 F | OXYGEN SATURATION: 98 % | HEART RATE: 74 BPM | WEIGHT: 176 LBS | DIASTOLIC BLOOD PRESSURE: 55 MMHG | HEIGHT: 65 IN

## 2021-12-19 PROBLEM — N13.4 HYDROURETER: Status: ACTIVE | Noted: 2021-12-19

## 2021-12-19 LAB
ALBUMIN SERPL-MCNC: 3.1 G/DL (ref 3.5–5)
ALBUMIN/GLOB SERPL: 1 {RATIO} (ref 1.1–2.2)
ALP SERPL-CCNC: 42 U/L (ref 45–117)
ALT SERPL-CCNC: 23 U/L (ref 12–78)
ANION GAP SERPL CALC-SCNC: 5 MMOL/L (ref 5–15)
AST SERPL-CCNC: 17 U/L (ref 15–37)
BASOPHILS # BLD: 0 K/UL (ref 0–0.1)
BASOPHILS NFR BLD: 0 % (ref 0–1)
BILIRUB SERPL-MCNC: 0.4 MG/DL (ref 0.2–1)
BUN SERPL-MCNC: 12 MG/DL (ref 6–20)
BUN/CREAT SERPL: 17 (ref 12–20)
CALCIUM SERPL-MCNC: 9 MG/DL (ref 8.5–10.1)
CHLORIDE SERPL-SCNC: 111 MMOL/L (ref 97–108)
CO2 SERPL-SCNC: 26 MMOL/L (ref 21–32)
CREAT SERPL-MCNC: 0.72 MG/DL (ref 0.55–1.02)
DIFFERENTIAL METHOD BLD: NORMAL
EOSINOPHIL # BLD: 0.1 K/UL (ref 0–0.4)
EOSINOPHIL NFR BLD: 2 % (ref 0–7)
ERYTHROCYTE [DISTWIDTH] IN BLOOD BY AUTOMATED COUNT: 12.5 % (ref 11.5–14.5)
GLOBULIN SER CALC-MCNC: 3.1 G/DL (ref 2–4)
GLUCOSE SERPL-MCNC: 92 MG/DL (ref 65–100)
HCT VFR BLD AUTO: 37.9 % (ref 35–47)
HGB BLD-MCNC: 12.5 G/DL (ref 11.5–16)
IMM GRANULOCYTES # BLD AUTO: 0 K/UL (ref 0–0.04)
IMM GRANULOCYTES NFR BLD AUTO: 0 % (ref 0–0.5)
LYMPHOCYTES # BLD: 1.9 K/UL (ref 0.8–3.5)
LYMPHOCYTES NFR BLD: 26 % (ref 12–49)
MAGNESIUM SERPL-MCNC: 2.2 MG/DL (ref 1.6–2.4)
MCH RBC QN AUTO: 29.6 PG (ref 26–34)
MCHC RBC AUTO-ENTMCNC: 33 G/DL (ref 30–36.5)
MCV RBC AUTO: 89.6 FL (ref 80–99)
MONOCYTES # BLD: 0.6 K/UL (ref 0–1)
MONOCYTES NFR BLD: 9 % (ref 5–13)
NEUTS SEG # BLD: 4.6 K/UL (ref 1.8–8)
NEUTS SEG NFR BLD: 63 % (ref 32–75)
NRBC # BLD: 0 K/UL (ref 0–0.01)
NRBC BLD-RTO: 0 PER 100 WBC
PHOSPHATE SERPL-MCNC: 3 MG/DL (ref 2.6–4.7)
PLATELET # BLD AUTO: 172 K/UL (ref 150–400)
PMV BLD AUTO: 10.4 FL (ref 8.9–12.9)
POTASSIUM SERPL-SCNC: 3.8 MMOL/L (ref 3.5–5.1)
PROT SERPL-MCNC: 6.2 G/DL (ref 6.4–8.2)
RBC # BLD AUTO: 4.23 M/UL (ref 3.8–5.2)
SODIUM SERPL-SCNC: 142 MMOL/L (ref 136–145)
WBC # BLD AUTO: 7.3 K/UL (ref 3.6–11)

## 2021-12-19 PROCEDURE — 74177 CT ABD & PELVIS W/CONTRAST: CPT

## 2021-12-19 PROCEDURE — 36415 COLL VENOUS BLD VENIPUNCTURE: CPT

## 2021-12-19 PROCEDURE — 85025 COMPLETE CBC W/AUTO DIFF WBC: CPT

## 2021-12-19 PROCEDURE — 74011250637 HC RX REV CODE- 250/637: Performed by: STUDENT IN AN ORGANIZED HEALTH CARE EDUCATION/TRAINING PROGRAM

## 2021-12-19 PROCEDURE — 74011000250 HC RX REV CODE- 250: Performed by: HOSPITALIST

## 2021-12-19 PROCEDURE — 83735 ASSAY OF MAGNESIUM: CPT

## 2021-12-19 PROCEDURE — 65410000002 HC RM PRIVATE OB

## 2021-12-19 PROCEDURE — 80053 COMPREHEN METABOLIC PANEL: CPT

## 2021-12-19 PROCEDURE — 74011250636 HC RX REV CODE- 250/636: Performed by: HOSPITALIST

## 2021-12-19 PROCEDURE — 84100 ASSAY OF PHOSPHORUS: CPT

## 2021-12-19 PROCEDURE — 74011000636 HC RX REV CODE- 636: Performed by: PHYSICIAN ASSISTANT

## 2021-12-19 PROCEDURE — 74011250636 HC RX REV CODE- 250/636: Performed by: STUDENT IN AN ORGANIZED HEALTH CARE EDUCATION/TRAINING PROGRAM

## 2021-12-19 RX ORDER — ONDANSETRON 2 MG/ML
4 INJECTION INTRAMUSCULAR; INTRAVENOUS
Status: DISCONTINUED | OUTPATIENT
Start: 2021-12-19 | End: 2021-12-19 | Stop reason: SDUPTHER

## 2021-12-19 RX ORDER — POLYETHYLENE GLYCOL 3350 17 G/17G
17 POWDER, FOR SOLUTION ORAL DAILY PRN
Status: DISCONTINUED | OUTPATIENT
Start: 2021-12-19 | End: 2021-12-21 | Stop reason: HOSPADM

## 2021-12-19 RX ORDER — TAMSULOSIN HYDROCHLORIDE 0.4 MG/1
0.4 CAPSULE ORAL ONCE
Status: COMPLETED | OUTPATIENT
Start: 2021-12-19 | End: 2021-12-19

## 2021-12-19 RX ORDER — ACETAMINOPHEN 650 MG/1
650 SUPPOSITORY RECTAL
Status: DISCONTINUED | OUTPATIENT
Start: 2021-12-19 | End: 2021-12-21 | Stop reason: HOSPADM

## 2021-12-19 RX ORDER — ATORVASTATIN CALCIUM 10 MG/1
10 TABLET, FILM COATED ORAL DAILY
Status: DISCONTINUED | OUTPATIENT
Start: 2021-12-20 | End: 2021-12-21 | Stop reason: HOSPADM

## 2021-12-19 RX ORDER — SODIUM CHLORIDE 0.9 % (FLUSH) 0.9 %
5-40 SYRINGE (ML) INJECTION AS NEEDED
Status: DISCONTINUED | OUTPATIENT
Start: 2021-12-19 | End: 2021-12-21 | Stop reason: HOSPADM

## 2021-12-19 RX ORDER — MORPHINE SULFATE 2 MG/ML
2 INJECTION, SOLUTION INTRAMUSCULAR; INTRAVENOUS
Status: DISCONTINUED | OUTPATIENT
Start: 2021-12-19 | End: 2021-12-21 | Stop reason: HOSPADM

## 2021-12-19 RX ORDER — SODIUM CHLORIDE 0.9 % (FLUSH) 0.9 %
5-40 SYRINGE (ML) INJECTION EVERY 8 HOURS
Status: DISCONTINUED | OUTPATIENT
Start: 2021-12-19 | End: 2021-12-21 | Stop reason: HOSPADM

## 2021-12-19 RX ORDER — SODIUM CHLORIDE 9 MG/ML
100 INJECTION, SOLUTION INTRAVENOUS CONTINUOUS
Status: DISCONTINUED | OUTPATIENT
Start: 2021-12-19 | End: 2021-12-21 | Stop reason: HOSPADM

## 2021-12-19 RX ORDER — SODIUM CHLORIDE 9 MG/ML
100 INJECTION, SOLUTION INTRAVENOUS CONTINUOUS
Status: DISCONTINUED | OUTPATIENT
Start: 2021-12-19 | End: 2021-12-19 | Stop reason: HOSPADM

## 2021-12-19 RX ORDER — ONDANSETRON 2 MG/ML
4 INJECTION INTRAMUSCULAR; INTRAVENOUS
Status: DISCONTINUED | OUTPATIENT
Start: 2021-12-19 | End: 2021-12-21 | Stop reason: HOSPADM

## 2021-12-19 RX ORDER — LEVOTHYROXINE SODIUM 75 UG/1
75 TABLET ORAL
Status: DISCONTINUED | OUTPATIENT
Start: 2021-12-20 | End: 2021-12-21 | Stop reason: HOSPADM

## 2021-12-19 RX ORDER — TAMSULOSIN HYDROCHLORIDE 0.4 MG/1
0.4 CAPSULE ORAL DAILY
Status: DISCONTINUED | OUTPATIENT
Start: 2021-12-20 | End: 2021-12-21 | Stop reason: HOSPADM

## 2021-12-19 RX ORDER — ACETAMINOPHEN 325 MG/1
650 TABLET ORAL
Status: DISCONTINUED | OUTPATIENT
Start: 2021-12-19 | End: 2021-12-21 | Stop reason: HOSPADM

## 2021-12-19 RX ORDER — ONDANSETRON 4 MG/1
4 TABLET, ORALLY DISINTEGRATING ORAL
Status: DISCONTINUED | OUTPATIENT
Start: 2021-12-19 | End: 2021-12-21 | Stop reason: HOSPADM

## 2021-12-19 RX ADMIN — CEFTRIAXONE SODIUM 1 G: 1 INJECTION, POWDER, FOR SOLUTION INTRAMUSCULAR; INTRAVENOUS at 16:57

## 2021-12-19 RX ADMIN — IOPAMIDOL 100 ML: 755 INJECTION, SOLUTION INTRAVENOUS at 00:37

## 2021-12-19 RX ADMIN — SODIUM CHLORIDE 100 ML/HR: 9 INJECTION, SOLUTION INTRAVENOUS at 10:34

## 2021-12-19 RX ADMIN — TAMSULOSIN HYDROCHLORIDE 0.4 MG: 0.4 CAPSULE ORAL at 10:32

## 2021-12-19 RX ADMIN — Medication 10 ML: at 22:00

## 2021-12-19 RX ADMIN — SODIUM CHLORIDE 100 ML/HR: 900 INJECTION, SOLUTION INTRAVENOUS at 20:45

## 2021-12-19 NOTE — H&P
History & Physical    Primary Care Provider: Charan Greene DO  Source of Information: Patient    History of Presenting Illness:   Stuart Lazaro is a 67 y.o. female who presents with right side abdominal pain   Stuart Lazaro, 67 y.o. female with a past medical history significant hypertension, hyperlipidemia and Hypothyroidism, surgical history of hysterectomy presents to the [facility] ED 12/18 night with cc of right lower quadrant abdominal pain radiating to the right low back onset a few hours prior to arrival, the pain is significantly worsening, rated a 10 out of 10 with associated nausea. Patient reports having an episode of diarrhea just prior to arrival.  She denies any similar severe pain in the past.  She does have a history of kidney stones and had stent then LITHOTRIPSY in the past .    She specifically denies fever, chills, body aches, chest pain, shortness of breath, dysuria, hematuria, blood in stool, sick contacts, recent abdominal procedure. Pt was given morphine, IVF, flomax in [place] [facility], then transfer to [facility] now        Review of Systems:  General: HPI, no changes of weight  HEENT: no headache, no vision changes, no nose discharge, no hearing changes   RES: no wheezing, no cough, no sob  CVS: no cp, no palpitation.   Muscular: no joint swelling, no muscle pain, no leg swelling  Skin: no rash, no itching   GI: no vomiting,HPI   : no dysuria, no hematuria, HPI   Hemo: no gum bleeding, no petechial   Neuro: no sensation changes, no focal weakness   Endo: no polydipsia   Psych: denied depression     Past Medical History:   Diagnosis Date    Arthritis     Asthma     Hypercholesterolemia     Hypertension     Kidney stone 7/23/2020    Long term current use of anticoagulant therapy     asa 81mg qd    Microscopic hematuria 7/23/2020    Shoulder fracture, right     NO SURGERY    Thyroid disease     Ureteric stone 7/23/2020    Urethral stricture 7/23/2020    UTI (urinary tract infection) 7/23/2020      Past Surgical History:   Procedure Laterality Date    HX COLONOSCOPY      HX HYSTERECTOMY      HX LITHOTRIPSY      HX LITHOTRIPSY  10/21/2020    HX REFRACTIVE SURGERY Bilateral     CATARACT REMOVAL    HX THYROIDECTOMY      HX TURP      cystoscopy    HX UROLOGICAL       Prior to Admission medications    Medication Sig Start Date End Date Taking? Authorizing Provider   aspirin delayed-release 81 mg tablet Take  by mouth daily. Provider, Historical   atorvastatin (LIPITOR) 10 mg tablet Take  by mouth daily. Provider, Historical   vitamin B complex (B COMPLETE PO) Take  by mouth. Provider, Historical   levothyroxine (SYNTHROID) 75 mcg tablet Take  by mouth Daily (before breakfast). Provider, Historical   lisinopriL (PRINIVIL, ZESTRIL) 5 mg tablet Take  by mouth daily. Provider, Historical   polyethylene glycol (Miralax) 17 gram/dose powder Take 17 g by mouth daily. As needed    Provider, Historical   multivitamin (ONE A DAY) tablet Take 1 Tab by mouth daily.     Provider, Historical     Allergies   Allergen Reactions    Advair Diskus [Fluticasone Propion-Salmeterol] Swelling     Swelling to throat    Mycobacterium Tuberculosis (Tuberculin Ppd) Rash    Cristian-Dur [Theophylline] Other (comments)    Sulfa (Sulfonamide Antibiotics) Hives and Rash    Tuberculin Ppd Other (comments) and Rash      Family History   Problem Relation Age of Onset    Alzheimer's Disease Mother     Heart Disease Father     Cancer Sister     Cancer Brother     Cancer Brother         SOCIAL HISTORY:  Patient resides:  Independently x   Assisted Living    SNF    With family care       Smoking history:   None x   Former    Chronic      Alcohol history:   None x   Social    Chronic      Ambulates:   Independently x   w/cane    w/walker    w/wc    CODE STATUS:  DNR    Full    Other      Objective:     Physical Exam:     Visit Vitals  /73 (BP 1 Location: Left upper arm, BP Patient Position: At rest;Supine)   Pulse 67   Temp 98.2 °F (36.8 °C)   Resp 18   SpO2 96%           General:  Alert, cooperative, no distress, appears stated age. Head:  Normocephalic, without obvious abnormality, atraumatic. Eyes:  Conjunctivae/corneas clear. PERRL, EOMs intact. Nose: Nares normal. Septum midline. Mucosa normal. No drainage or sinus tenderness. Throat: Lips, mucosa, and tongue normal. Teeth and gums normal.   Neck: Supple, symmetrical, trachea midline, no adenopathy, thyroid: no enlargement/tenderness/nodules, no carotid bruit and no JVD. Back:   Symmetric, no curvature. ROM normal. No CVA tenderness. Lungs:   Clear to auscultation bilaterally. Chest wall:  No tenderness or deformity. Heart:  Regular rate and rhythm, S1, S2 normal, no murmur, click, rub or gallop. Abdomen:   Soft, right CVA tender. Bowel sounds normal. No masses,  No organomegaly. Extremities: Extremities normal, atraumatic, no cyanosis or edema. Pulses: 2+ and symmetric all extremities. Skin: Skin color, texture, turgor normal. No rashes or lesions   Neurologic: CNII-XII intact. None focal              Data Review:     Recent Days:  Recent Labs     12/18/21 2150   WBC 8.2   HGB 13.5   HCT 41.9        Recent Labs     12/18/21  2150      K 4.1   *   CO2 26   *   BUN 18   CREA 0.82   CA 9.6   ALB 3.5   ALT 29     No results for input(s): PH, PCO2, PO2, HCO3, FIO2 in the last 72 hours.     24 Hour Results:  Recent Results (from the past 24 hour(s))   CBC WITH AUTOMATED DIFF    Collection Time: 12/18/21  9:50 PM   Result Value Ref Range    WBC 8.2 3.6 - 11.0 K/uL    RBC 4.59 3.80 - 5.20 M/uL    HGB 13.5 11.5 - 16.0 g/dL    HCT 41.9 35.0 - 47.0 %    MCV 91.3 80.0 - 99.0 FL    MCH 29.4 26.0 - 34.0 PG    MCHC 32.2 30.0 - 36.5 g/dL    RDW 12.6 11.5 - 14.5 %    PLATELET 520 900 - 963 K/uL    MPV 10.6 8.9 - 12.9 FL    NRBC 0.0 0.0  WBC    ABSOLUTE NRBC 0.00 0.00 - 0.01 K/uL    NEUTROPHILS 74 32 - 75 %    LYMPHOCYTES 18 12 - 49 %    MONOCYTES 6 5 - 13 %    EOSINOPHILS 2 0 - 7 %    BASOPHILS 0 0 - 1 %    IMMATURE GRANULOCYTES 0 0 - 0.5 %    ABS. NEUTROPHILS 6.0 1.8 - 8.0 K/UL    ABS. LYMPHOCYTES 1.5 0.8 - 3.5 K/UL    ABS. MONOCYTES 0.5 0.0 - 1.0 K/UL    ABS. EOSINOPHILS 0.2 0.0 - 0.4 K/UL    ABS. BASOPHILS 0.0 0.0 - 0.1 K/UL    ABS. IMM. GRANS. 0.0 0.00 - 0.04 K/UL    DF AUTOMATED     METABOLIC PANEL, COMPREHENSIVE    Collection Time: 12/18/21  9:50 PM   Result Value Ref Range    Sodium 143 136 - 145 mmol/L    Potassium 4.1 3.5 - 5.1 mmol/L    Chloride 111 (H) 97 - 108 mmol/L    CO2 26 21 - 32 mmol/L    Anion gap 6 5 - 15 mmol/L    Glucose 124 (H) 65 - 100 mg/dL    BUN 18 6 - 20 mg/dL    Creatinine 0.82 0.55 - 1.02 mg/dL    BUN/Creatinine ratio 22 (H) 12 - 20      GFR est AA >60 >60 ml/min/1.73m2    GFR est non-AA >60 >60 ml/min/1.73m2    Calcium 9.6 8.5 - 10.1 mg/dL    Bilirubin, total 0.3 0.2 - 1.0 mg/dL    AST (SGOT) 20 15 - 37 U/L    ALT (SGPT) 29 12 - 78 U/L    Alk.  phosphatase 52 45 - 117 U/L    Protein, total 6.6 6.4 - 8.2 g/dL    Albumin 3.5 3.5 - 5.0 g/dL    Globulin 3.1 2.0 - 4.0 g/dL    A-G Ratio 1.1 1.1 - 2.2     TROPONIN-HIGH SENSITIVITY    Collection Time: 12/18/21  9:50 PM   Result Value Ref Range    Troponin-High Sensitivity 12 0 - 51 ng/L   URINALYSIS W/MICROSCOPIC    Collection Time: 12/18/21  9:53 PM   Result Value Ref Range    Color Yellow/Straw      Appearance Turbid (A) Clear      Specific gravity 1.017 1.003 - 1.030      pH (UA) 5.0 5.0 - 8.0      Protein 30 (A) Negative mg/dL    Glucose Negative Negative mg/dL    Ketone Negative Negative mg/dL    Bilirubin Negative Negative      Blood Small (A) Negative      Urobilinogen 0.1 0.1 - 1.0 EU/dL    Nitrites Negative Negative      Leukocyte Esterase Moderate (A) Negative      WBC 20-50 0 - 4 /hpf    RBC 10-20 0 - 5 /hpf    Bacteria Negative Negative /hpf    Mucus Trace /lpf         Imaging:   CT ABD PELV W CONT    Result Date: 12/19/2021  High-grade obstruction from 5 mm distal right ureteral stone      Assessment:     Active Problems:    Hydroureter (12/19/2021)           Plan:     1. Right ureter stone with obstruction: right hydroureter, IVF, pain control. Start flomax, concerning of pyelo/UTI, empirically iv rocephin. Urologist consult, will keep npo after midnight in case pt will need stent  2. H/o HTN: bp low, hold home lisinopirl. 3. Hypothyroidism: resume home meds   4.  HLP: continue home meds        Signed By: Jose Golden MD     December 19, 2021

## 2021-12-19 NOTE — ED NOTES
Report given to Jammie PERRY at [place] [id number]. transported via [facility].

## 2021-12-19 NOTE — ED NOTES
Per  transfer center called and stated \" no bed available\" at this time at [facility] for pt.  Provider aware

## 2021-12-19 NOTE — ED PROVIDER NOTES
EMERGENCY DEPARTMENT HISTORY AND PHYSICAL EXAM      Date: 12/18/2021  Patient Name: Salbador Cortez    History of Presenting Illness     Chief Complaint   Patient presents with    Abdominal Pain    Flank Pain       History Provided By: Patient, patient's daughter    HPI: Salbador Cortez, 67 y.o. female with a past medical history significant hypertension, hyperlipidemia and Hypothyroidism, surgical history of hysterectomy presents to the ED with cc of right lower quadrant abdominal pain radiating to the right low back onset a few hours prior to arrival, the pain is significantly worsening, rated a 10 out of 10 with associated nausea. Patient reports having an episode of diarrhea just prior to arrival.  She denies any history of pain such as this. She does have a history of kidney stones. She specifically denies fever, chills, body aches, chest pain, shortness of breath, dysuria, hematuria, blood in stool, sick contacts, recent abdominal procedure. There are no other complaints, changes, or physical findings at this time.     PCP: Radha Worley DO    Facility-Administered Medications Ordered in Other Encounters   Medication Dose Route Frequency Provider Last Rate Last Admin    lisinopriL (PRINIVIL, ZESTRIL) tablet 5 mg  5 mg Oral DAILY Bk aWtts NP        hydrALAZINE (APRESOLINE) 20 mg/mL injection 10 mg  10 mg IntraVENous Q6H PRN Bk Watts NP   10 mg at 12/20/21 0372    0.9% sodium chloride infusion  100 mL/hr IntraVENous CONTINUOUS Yudi Fortune  mL/hr at 12/19/21 2045 100 mL/hr at 12/19/21 2045    morphine injection 2 mg  2 mg IntraVENous Q4H PRN Yudi Fortune MD        tamsulosin [facility]) capsule 0.4 mg  0.4 mg Oral DAILY Yudi Fortune MD        levothyroxine (SYNTHROID) tablet 75 mcg  75 mcg Oral ACB Yudi Fortune MD        atorvastatin (LIPITOR) tablet 10 mg  10 mg Oral DAILY Yudi Fortune MD        cefTRIAXone (ROCEPHIN) 1 g in sterile water (preservative free) 10 mL IV syringe  1 g IntraVENous Q24H Amelie Mooney MD   1 g at 12/19/21 1657    sodium chloride (NS) flush 5-40 mL  5-40 mL IntraVENous Q8H Amelie Mooney MD   10 mL at 12/19/21 2200    sodium chloride (NS) flush 5-40 mL  5-40 mL IntraVENous PRN Amelie Mooney MD        acetaminophen (TYLENOL) tablet 650 mg  650 mg Oral Q6H PRN Amelie Mooney MD        Or   Iveth Hodges acetaminophen (TYLENOL) suppository 650 mg  650 mg Rectal Q6H PRN Amelie Mooney MD        polyethylene glycol [facility]) packet 17 g  17 g Oral DAILY PRN Amelie Mooney MD        ondansetron (ZOFRAN ODT) tablet 4 mg  4 mg Oral Q8H PRN Amelie Mooney MD        Or    ondansetron [facility]) injection 4 mg  4 mg IntraVENous Q6H PRN Amelie Mooney MD           Past History     Past Medical History:  Past Medical History:   Diagnosis Date    Arthritis     Asthma     Hypercholesterolemia     Hypertension     Kidney stone 7/23/2020    Long term current use of anticoagulant therapy     asa 81mg qd    Microscopic hematuria 7/23/2020    Shoulder fracture, right     NO SURGERY    Thyroid disease     Ureteric stone 7/23/2020    Urethral stricture 7/23/2020    UTI (urinary tract infection) 7/23/2020       Past Surgical History:  Past Surgical History:   Procedure Laterality Date    HX COLONOSCOPY      HX HYSTERECTOMY      HX LITHOTRIPSY      HX LITHOTRIPSY  10/21/2020    HX REFRACTIVE SURGERY Bilateral     CATARACT REMOVAL    HX THYROIDECTOMY      HX TURP      cystoscopy    HX UROLOGICAL         Family History:  Family History   Problem Relation Age of Onset    Alzheimer's Disease Mother     Heart Disease Father     Cancer Sister     Cancer Brother     Cancer Brother        Social History:  Social History     Tobacco Use    Smoking status: Never Smoker    Smokeless tobacco: Never Used   Substance Use Topics    Alcohol use: Not Currently    Drug use: Never       Allergies:   Allergies   Allergen Reactions    Advair Diskus [Fluticasone Propion-Salmeterol] Swelling     Swelling to throat    Mycobacterium Tuberculosis (Tuberculin Ppd) Rash    Cristian-Dur [Theophylline] Other (comments)    Sulfa (Sulfonamide Antibiotics) Hives and Rash    Tuberculin Ppd Other (comments) and Rash         Review of Systems   Review of Systems   Constitutional: Negative for activity change, chills and fever. HENT: Negative for congestion, ear pain, rhinorrhea, sneezing and sore throat. Eyes: Negative for pain and visual disturbance. Respiratory: Negative for cough and shortness of breath. Cardiovascular: Negative for chest pain. Gastrointestinal: Positive for abdominal pain, diarrhea and nausea. Negative for vomiting. Genitourinary: Positive for flank pain. Negative for dysuria and hematuria. Musculoskeletal: Negative for gait problem. Skin: Negative for rash. Neurological: Negative for speech difficulty, weakness and headaches. Psychiatric/Behavioral: The patient is not nervous/anxious. All other systems reviewed and are negative. Physical Exam   Physical Exam  Vitals and nursing note reviewed. Constitutional:       General: She is not in acute distress. Appearance: Normal appearance. She is not ill-appearing or toxic-appearing. Comments: Appears uncomfortable   HENT:      Head: Normocephalic and atraumatic. Nose: Nose normal.      Mouth/Throat:      Mouth: Mucous membranes are moist.   Eyes:      Extraocular Movements: Extraocular movements intact. Conjunctiva/sclera: Conjunctivae normal.      Pupils: Pupils are equal, round, and reactive to light. Cardiovascular:      Rate and Rhythm: Normal rate. Pulses: Normal pulses. Heart sounds: Normal heart sounds. Pulmonary:      Effort: Pulmonary effort is normal. No respiratory distress. Breath sounds: Normal breath sounds. Abdominal:      General: Bowel sounds are normal.      Palpations: Abdomen is soft. Tenderness: There is abdominal tenderness in the right upper quadrant and right lower quadrant.  There is no right CVA tenderness, left CVA tenderness, guarding or rebound. Hernia: No hernia is present. Musculoskeletal:         General: No deformity or signs of injury. Normal range of motion. Cervical back: Normal range of motion. Skin:     General: Skin is warm and dry. Capillary Refill: Capillary refill takes less than 2 seconds. Findings: No rash. Neurological:      General: No focal deficit present. Mental Status: She is alert and oriented to person, place, and time. Cranial Nerves: No cranial nerve deficit. Psychiatric:         Mood and Affect: Mood normal.         Diagnostic Study Results     Labs -     Recent Results (from the past 48 hour(s))   CBC WITH AUTOMATED DIFF    Collection Time: 12/18/21  9:50 PM   Result Value Ref Range    WBC 8.2 3.6 - 11.0 K/uL    RBC 4.59 3.80 - 5.20 M/uL    HGB 13.5 11.5 - 16.0 g/dL    HCT 41.9 35.0 - 47.0 %    MCV 91.3 80.0 - 99.0 FL    MCH 29.4 26.0 - 34.0 PG    MCHC 32.2 30.0 - 36.5 g/dL    RDW 12.6 11.5 - 14.5 %    PLATELET 389 134 - 324 K/uL    MPV 10.6 8.9 - 12.9 FL    NRBC 0.0 0.0  WBC    ABSOLUTE NRBC 0.00 0.00 - 0.01 K/uL    NEUTROPHILS 74 32 - 75 %    LYMPHOCYTES 18 12 - 49 %    MONOCYTES 6 5 - 13 %    EOSINOPHILS 2 0 - 7 %    BASOPHILS 0 0 - 1 %    IMMATURE GRANULOCYTES 0 0 - 0.5 %    ABS. NEUTROPHILS 6.0 1.8 - 8.0 K/UL    ABS. LYMPHOCYTES 1.5 0.8 - 3.5 K/UL    ABS. MONOCYTES 0.5 0.0 - 1.0 K/UL    ABS. EOSINOPHILS 0.2 0.0 - 0.4 K/UL    ABS. BASOPHILS 0.0 0.0 - 0.1 K/UL    ABS. IMM.  GRANS. 0.0 0.00 - 0.04 K/UL    DF AUTOMATED     METABOLIC PANEL, COMPREHENSIVE    Collection Time: 12/18/21  9:50 PM   Result Value Ref Range    Sodium 143 136 - 145 mmol/L    Potassium 4.1 3.5 - 5.1 mmol/L    Chloride 111 (H) 97 - 108 mmol/L    CO2 26 21 - 32 mmol/L    Anion gap 6 5 - 15 mmol/L    Glucose 124 (H) 65 - 100 mg/dL    BUN 18 6 - 20 mg/dL    Creatinine 0.82 0.55 - 1.02 mg/dL    BUN/Creatinine ratio 22 (H) 12 - 20      GFR est AA >60 >60 ml/min/1.73m2    GFR est non-AA >60 >60 ml/min/1.73m2    Calcium 9.6 8.5 - 10.1 mg/dL    Bilirubin, total 0.3 0.2 - 1.0 mg/dL    AST (SGOT) 20 15 - 37 U/L    ALT (SGPT) 29 12 - 78 U/L    Alk. phosphatase 52 45 - 117 U/L    Protein, total 6.6 6.4 - 8.2 g/dL    Albumin 3.5 3.5 - 5.0 g/dL    Globulin 3.1 2.0 - 4.0 g/dL    A-G Ratio 1.1 1.1 - 2.2     TROPONIN-HIGH SENSITIVITY    Collection Time: 12/18/21  9:50 PM   Result Value Ref Range    Troponin-High Sensitivity 12 0 - 51 ng/L   URINALYSIS W/MICROSCOPIC    Collection Time: 12/18/21  9:53 PM   Result Value Ref Range    Color Yellow/Straw      Appearance Turbid (A) Clear      Specific gravity 1.017 1.003 - 1.030      pH (UA) 5.0 5.0 - 8.0      Protein 30 (A) Negative mg/dL    Glucose Negative Negative mg/dL    Ketone Negative Negative mg/dL    Bilirubin Negative Negative      Blood Small (A) Negative      Urobilinogen 0.1 0.1 - 1.0 EU/dL    Nitrites Negative Negative      Leukocyte Esterase Moderate (A) Negative      WBC 20-50 0 - 4 /hpf    RBC 10-20 0 - 5 /hpf    Bacteria Negative Negative /hpf    Mucus Trace /lpf   CBC WITH AUTOMATED DIFF    Collection Time: 12/19/21  4:27 PM   Result Value Ref Range    WBC 7.3 3.6 - 11.0 K/uL    RBC 4.23 3.80 - 5.20 M/uL    HGB 12.5 11.5 - 16.0 g/dL    HCT 37.9 35.0 - 47.0 %    MCV 89.6 80.0 - 99.0 FL    MCH 29.6 26.0 - 34.0 PG    MCHC 33.0 30.0 - 36.5 g/dL    RDW 12.5 11.5 - 14.5 %    PLATELET 077 919 - 872 K/uL    MPV 10.4 8.9 - 12.9 FL    NRBC 0.0 0  WBC    ABSOLUTE NRBC 0.00 0.00 - 0.01 K/uL    NEUTROPHILS 63 32 - 75 %    LYMPHOCYTES 26 12 - 49 %    MONOCYTES 9 5 - 13 %    EOSINOPHILS 2 0 - 7 %    BASOPHILS 0 0 - 1 %    IMMATURE GRANULOCYTES 0 0.0 - 0.5 %    ABS. NEUTROPHILS 4.6 1.8 - 8.0 K/UL    ABS. LYMPHOCYTES 1.9 0.8 - 3.5 K/UL    ABS. MONOCYTES 0.6 0.0 - 1.0 K/UL    ABS. EOSINOPHILS 0.1 0.0 - 0.4 K/UL    ABS. BASOPHILS 0.0 0.0 - 0.1 K/UL    ABS. IMM.  GRANS. 0.0 0.00 - 0.04 K/UL    DF AUTOMATED     MAGNESIUM Collection Time: 12/19/21  4:27 PM   Result Value Ref Range    Magnesium 2.2 1.6 - 2.4 mg/dL   METABOLIC PANEL, COMPREHENSIVE    Collection Time: 12/19/21  4:27 PM   Result Value Ref Range    Sodium 142 136 - 145 mmol/L    Potassium 3.8 3.5 - 5.1 mmol/L    Chloride 111 (H) 97 - 108 mmol/L    CO2 26 21 - 32 mmol/L    Anion gap 5 5 - 15 mmol/L    Glucose 92 65 - 100 mg/dL    BUN 12 6 - 20 MG/DL    Creatinine 0.72 0.55 - 1.02 MG/DL    BUN/Creatinine ratio 17 12 - 20      GFR est AA >60 >60 ml/min/1.73m2    GFR est non-AA >60 >60 ml/min/1.73m2    Calcium 9.0 8.5 - 10.1 MG/DL    Bilirubin, total 0.4 0.2 - 1.0 MG/DL    ALT (SGPT) 23 12 - 78 U/L    AST (SGOT) 17 15 - 37 U/L    Alk. phosphatase 42 (L) 45 - 117 U/L    Protein, total 6.2 (L) 6.4 - 8.2 g/dL    Albumin 3.1 (L) 3.5 - 5.0 g/dL    Globulin 3.1 2.0 - 4.0 g/dL    A-G Ratio 1.0 (L) 1.1 - 2.2     PHOSPHORUS    Collection Time: 12/19/21  4:27 PM   Result Value Ref Range    Phosphorus 3.0 2.6 - 4.7 MG/DL       Radiologic Studies -   XR Results (most recent):  Results from Hospital Encounter encounter on 12/15/20    XR ABD AP W OBL & CONE/ 2 V    Narrative  Abdomen, 5 views    Comparison abdomen series November 10, 2020. Multiple renal calyceal stones left kidney, similar compared to prior imaging. Phleboliths lower pelvis. Stool through colon. Abdominal aorta/pelvic atherosclerosis. Lumbar spondylosis. CT Results  (Last 48 hours)               12/19/21 0037  CT ABD PELV W CONT Final result    Impression:  High-grade obstruction from 5 mm distal right ureteral stone       Narrative:  CT dose reduction was achieved through use of a standardized protocol tailored   for this examination and automatic exposure control for dose modulation. Contrast study shows clear lung bases. Liver, spleen, atrophic pancreas, gallbladder and adrenals are normal. Multiple   cysts in the left kidney with multiple stones and calyceal diverticula.  Largest   on is 9 mm Moderate to severe right hydronephrosis with moderate perinephric fat stranding   and likely mild urinary extravasation. Dilated ureter is followed to a 5 mm   stone just below the iliac crossing. 1 mm stone in the kidney. Ureter is quite   tortuous. No small bowel vascular abnormality, lymphadenopathy or free fluid       Uterus is out. Normal bladder. Advanced distal diverticulosis without wall   thickening or fat stranding. No abdominal wall hernia       Degenerative spinal stenosis                   Medical Decision Making and ED Course   I am the first provider for this patient. I reviewed the vital signs, available nursing notes, past medical history, past surgical history, family history and social history. Vital Signs-Reviewed the patient's vital signs. Wt Readings from Last 3 Encounters:   12/18/21 79.8 kg (176 lb)   12/15/20 71.2 kg (157 lb)   11/10/20 74.4 kg (164 lb)     Temp Readings from Last 3 Encounters:   12/20/21 98.3 °F (36.8 °C)   12/18/21 98.2 °F (36.8 °C)   12/15/20 97.5 °F (36.4 °C) (Temporal)     BP Readings from Last 3 Encounters:   12/20/21 (!) 150/75   12/19/21 (!) 139/55   11/10/20 117/72     Pulse Readings from Last 3 Encounters:   12/20/21 71   12/19/21 74   10/21/20 74       No data found. Records Reviewed: Nursing Notes and Old Medical Records    Provider Notes (Medical Decision Making):       MDM  Number of Diagnoses or Management Options  Hydronephrosis, right  Obstruction of right ureter  Ureterolithiasis  Diagnosis management comments: 58-year-old female is presenting with 10 out of 10 right-sided abdominal pain. History of kidney stones. She is afebrile. Pain improved after 2 of morphine. CT abdomen pelvis with evidence of high-grade obstruction secondary to a 5 mm distal ureteral stone with evidence of severe hydronephrosis and mild urinary extravasation. There is no urology coverage at this facility at this time.   I attempted to contact the ED physician at [facility] who did not feel that this patient met any admission criteria. Stated that I should discussed the case with my ED attending. I discussed the case with Dr. Riley Doty who has reviewed labs, CT scan, and will call the transfer center back. Amount and/or Complexity of Data Reviewed  Clinical lab tests: ordered and reviewed  Tests in the radiology section of CPT®: ordered and reviewed  Review and summarize past medical records: yes          ED Course:   Initial assessment performed. The patients presenting problems have been discussed, and they are in agreement with the care plan formulated and outlined with them. I have encouraged them to ask questions as they arise throughout their visit. 2:00 AM  No urology coverage at this time. Consult Note:  2:15 AM  Chandler Garcia PA-C spoke with Dr. Lisa Bradford,  Specialty: ED Attending at [facility]  Discussed pt's hx, disposition, and available diagnostic and imaging results. Advises that she does not feel this patient meets admission criteria, advises that I speak with my ED attending regarding this case. Consult Note:  2:31 AM  Chandler Garcia PA-C spoke with Dr. Riley Doty  Reviewed CT scan and noted mild urinary extravasation        ED Course as of 12/20/21 0507   Sun Dec 19, 2021   4463 4:19 AM  Care transferred to Dr. Riley Doty, pending transfer [EC]   2143 Discussed case with ED physician. Recommended direct admit to hospitalist. Will contact hospitalist team. [SS]   4196 Accepted by hospitalist Dr. Mitesh Lynn. Will transfer when bed available. [SS]      ED Course User Index  [EC] Carmelina Castillo PA-C  [SS] Arelis Kennedy MD         Procedures       Chandler Garcia PA-C    Procedures     Chandler Garcia PA-C        Disposition     Disposition: Transferred to [address] [place] [address] patient verbally agreed to transfer and understand the risks involved as outlined in the EMTALA form.     Transferred to Another Facility      Diagnosis Clinical Impression:   1. Ureterolithiasis    2. Obstruction of right ureter    3. Hydronephrosis, right        Attestations:    Alondra Santos PA-C    Please note that this dictation was completed with [employer], the computer voice recognition software. Quite often unanticipated grammatical, syntax, homophones, and other interpretive errors are inadvertently transcribed by the computer software. Please disregard these errors. Please excuse any errors that have escaped final proofreading. Thank you.

## 2021-12-20 LAB
APPEARANCE UR: CLEAR
ATRIAL RATE: 71 BPM
BACTERIA URNS QL MICRO: NEGATIVE /HPF
BILIRUB UR QL: NEGATIVE
CALCULATED P AXIS, ECG09: 61 DEGREES
CALCULATED R AXIS, ECG10: 29 DEGREES
CALCULATED T AXIS, ECG11: 45 DEGREES
COLOR UR: ABNORMAL
COVID-19 RAPID TEST, COVR: NOT DETECTED
DIAGNOSIS, 93000: NORMAL
EPITH CASTS URNS QL MICRO: ABNORMAL /LPF
GLUCOSE UR STRIP.AUTO-MCNC: NEGATIVE MG/DL
HGB UR QL STRIP: ABNORMAL
HYALINE CASTS URNS QL MICRO: ABNORMAL /LPF (ref 0–5)
KETONES UR QL STRIP.AUTO: 15 MG/DL
LEUKOCYTE ESTERASE UR QL STRIP.AUTO: NEGATIVE
NITRITE UR QL STRIP.AUTO: NEGATIVE
P-R INTERVAL, ECG05: 216 MS
PH UR STRIP: 7.5 [PH] (ref 5–8)
PROT UR STRIP-MCNC: NEGATIVE MG/DL
Q-T INTERVAL, ECG07: 374 MS
QRS DURATION, ECG06: 72 MS
QTC CALCULATION (BEZET), ECG08: 406 MS
RBC #/AREA URNS HPF: ABNORMAL /HPF (ref 0–5)
SOURCE, COVRS: NORMAL
SP GR UR REFRACTOMETRY: 1.01 (ref 1–1.03)
UA: UC IF INDICATED,UAUC: ABNORMAL
UROBILINOGEN UR QL STRIP.AUTO: 0.2 EU/DL (ref 0.2–1)
VENTRICULAR RATE, ECG03: 71 BPM
WBC URNS QL MICRO: ABNORMAL /HPF (ref 0–4)

## 2021-12-20 PROCEDURE — 87635 SARS-COV-2 COVID-19 AMP PRB: CPT

## 2021-12-20 PROCEDURE — 81001 URINALYSIS AUTO W/SCOPE: CPT

## 2021-12-20 PROCEDURE — 65410000002 HC RM PRIVATE OB

## 2021-12-20 PROCEDURE — 74011250636 HC RX REV CODE- 250/636: Performed by: HOSPITALIST

## 2021-12-20 PROCEDURE — 74011250637 HC RX REV CODE- 250/637: Performed by: HOSPITALIST

## 2021-12-20 PROCEDURE — 74011250637 HC RX REV CODE- 250/637: Performed by: NURSE PRACTITIONER

## 2021-12-20 PROCEDURE — 74011000250 HC RX REV CODE- 250: Performed by: HOSPITALIST

## 2021-12-20 PROCEDURE — 74011250636 HC RX REV CODE- 250/636: Performed by: NURSE PRACTITIONER

## 2021-12-20 RX ORDER — LISINOPRIL 5 MG/1
5 TABLET ORAL DAILY
Status: DISCONTINUED | OUTPATIENT
Start: 2021-12-20 | End: 2021-12-21 | Stop reason: HOSPADM

## 2021-12-20 RX ORDER — HYDRALAZINE HYDROCHLORIDE 20 MG/ML
10 INJECTION INTRAMUSCULAR; INTRAVENOUS
Status: DISCONTINUED | OUTPATIENT
Start: 2021-12-20 | End: 2021-12-21 | Stop reason: HOSPADM

## 2021-12-20 RX ADMIN — ATORVASTATIN CALCIUM 10 MG: 10 TABLET, FILM COATED ORAL at 09:17

## 2021-12-20 RX ADMIN — Medication 10 ML: at 05:37

## 2021-12-20 RX ADMIN — LISINOPRIL 5 MG: 5 TABLET ORAL at 09:17

## 2021-12-20 RX ADMIN — TAMSULOSIN HYDROCHLORIDE 0.4 MG: 0.4 CAPSULE ORAL at 09:17

## 2021-12-20 RX ADMIN — SODIUM CHLORIDE 100 ML/HR: 900 INJECTION, SOLUTION INTRAVENOUS at 05:37

## 2021-12-20 RX ADMIN — Medication 10 ML: at 14:00

## 2021-12-20 RX ADMIN — LEVOTHYROXINE SODIUM 75 MCG: 0.07 TABLET ORAL at 06:39

## 2021-12-20 RX ADMIN — CEFTRIAXONE SODIUM 1 G: 1 INJECTION, POWDER, FOR SOLUTION INTRAMUSCULAR; INTRAVENOUS at 15:49

## 2021-12-20 RX ADMIN — HYDRALAZINE HYDROCHLORIDE 10 MG: 20 INJECTION INTRAMUSCULAR; INTRAVENOUS at 04:52

## 2021-12-20 NOTE — PROGRESS NOTES
Verbal shift change report given to Tj Menendez RN (oncoming nurse) by Ward Ravi RN (offgoing nurse). Report included the following information SBAR.     0424 perfect serve Boyd Blizzard NP  Patient blood pressure was 178/83 (115) on repeat it was 170/89 (116) HR stable in the 70s. Pt has history of hypertension but BP medication was not ordered due to low BP. Pt states she takes lisinopril 5mg daily at home. Thanks.     0440 orders for IV hydralazine PRN as well as daily lisinopril ordered for 0900 am. Will give IV hydralazine and monitor BP

## 2021-12-20 NOTE — H&P (VIEW-ONLY)
Urology Consult    Subjective:     Date of Consultation:  December 19, 2021    Referring Physician: Ashley Velasquez    Reason for Consultation:  Ureteral stone    History of Present Illness:   Patient is a 67 y.o.  female who is being seen for right ureteral stone . She was admitted to the hospital for Hydroureter [N13.4]. She had been seen in the ER at [facility] with RLQ pain 10/10. CT showed a R distal ureteral stone with hydronephrosis. Transferred directly to floor at [facility] apparently and we were called. Patient denies current pain. Denies fevers, chills, nausea or vomiting. She did have some diarrhea one day last week. Reviewed records and has been a patient of Dr. Jeremy Villanueva in the past and has had ESWL several times on the L last year. Creatinine 0.72. WBC 7.3.  UA nitrite negative, 20 to 50 WBC and 10 to 20 RBC. No bacteria.     Past Medical History:   Diagnosis Date    Arthritis     Asthma     Hypercholesterolemia     Hypertension     Kidney stone 7/23/2020    Long term current use of anticoagulant therapy     asa 81mg qd    Microscopic hematuria 7/23/2020    Shoulder fracture, right     NO SURGERY    Thyroid disease     Ureteric stone 7/23/2020    Urethral stricture 7/23/2020    UTI (urinary tract infection) 7/23/2020      Past Surgical History:   Procedure Laterality Date    HX COLONOSCOPY      HX HYSTERECTOMY      HX LITHOTRIPSY      HX LITHOTRIPSY  10/21/2020    HX REFRACTIVE SURGERY Bilateral     CATARACT REMOVAL    HX THYROIDECTOMY      HX TURP      cystoscopy    HX UROLOGICAL        Family History   Problem Relation Age of Onset    Alzheimer's Disease Mother     Heart Disease Father     Cancer Sister     Cancer Brother     Cancer Brother       Social History     Tobacco Use    Smoking status: Never Smoker    Smokeless tobacco: Never Used   Substance Use Topics    Alcohol use: Not Currently     Allergies   Allergen Reactions    Advair Diskus [Fluticasone Propion-Salmeterol] Swelling     Swelling to throat    Mycobacterium Tuberculosis (Tuberculin Ppd) Rash    Cristian-Dur [Theophylline] Other (comments)    Sulfa (Sulfonamide Antibiotics) Hives and Rash    Tuberculin Ppd Other (comments) and Rash      Prior to Admission medications    Medication Sig Start Date End Date Taking? Authorizing Provider   aspirin delayed-release 81 mg tablet Take  by mouth daily. Provider, Historical   atorvastatin (LIPITOR) 10 mg tablet Take  by mouth daily. Provider, Historical   vitamin B complex (B COMPLETE PO) Take  by mouth. Provider, Historical   levothyroxine (SYNTHROID) 75 mcg tablet Take  by mouth Daily (before breakfast). Provider, Historical   lisinopriL (PRINIVIL, ZESTRIL) 5 mg tablet Take  by mouth daily. Provider, Historical   polyethylene glycol (Miralax) 17 gram/dose powder Take 17 g by mouth daily. As needed    Provider, Historical   multivitamin (ONE A DAY) tablet Take 1 Tab by mouth daily. Provider, Historical         Review of Systems:  Pertinent items are noted in HPI. Objective:     Patient Vitals for the past 8 hrs:   BP Temp Pulse Resp SpO2   21 1454 130/73 98.2 °F (36.8 °C) 67 18 96 %     Temp (24hrs), Av.2 °F (36.8 °C), Min:98.2 °F (36.8 °C), Max:98.2 °F (36.8 °C)      Intake and Output:    0701 -  1900  In: -   Out: 300 [Urine:300]    EXAMINATION:     Appearance: well-developed NAD   Conjunctiva/Lids: conjunctivae and lids normal   External Ears/Nose: normal no lesions or deformities   Neck: trachea midline   Respiratory Effort: breathing easily   Lower Extremity: no edema   Abdomen/Flank: soft non-tender without masses; no CVA tenderness   Liver/Spleen: no organomegaly   Hernia: no ventral hernia    Gait/Station: normal   Skin Inspection: warm and dry   Mood/Affect: normal         Assessment:     Active Problems:    Hydroureter (2021)    R distal ureteral stone with hydronephrosis    Plan:     Discussed findiings. Options reviewed including following conservatively, stent, or ureteroscopy. ESWL possible if stone visible, but this would need to be delayed due to ASA. Not toxic or in pain at this time. Will plan to make NPO after midnight and consider intervention tomorrow based on symptoms. Risks and benefits of ureteroscopy, laser, basket, stent reviewed including bleeding, infection, injury to ureter, inability to treat stone, as well as need for stent and further procedure.

## 2021-12-20 NOTE — PROGRESS NOTES
Bedside and Verbal shift change report given to Tyrell Millan RN (oncoming nurse) by [facility], RN (offgoing nurse). Report included the following information SBAR, Kardex, ED Summary, OR Summary, Procedure Summary, Intake/Output, MAR, Accordion and Recent Results.

## 2021-12-20 NOTE — PROGRESS NOTES
Patient: Genesis Elizabeth MRN: [id number]  SSN: [id number]    YOB: 1945  Age: 67 y.o. Sex: female        ADMITTED: 2021 to Gabby Shankar MD by Prudencio Weber MD for Hydroureter [N13.4]  POD# * No surgery found *     Genesis Elizabeth is doing fair still c/o R hip discomfort . Stats pain improved with narcotics . Discussed Ct findings and need for intervention . Pt in favor of Ureteroscopy with laser of atone today . She states she has never passed a stone without some kind of intervention . Vitals: Temp (24hrs), Av.2 °F (36.8 °C), Min:98.2 °F (36.8 °C), Max:98.3 °F (36.8 °C)    Blood pressure (!) 145/77, pulse 70, temperature 98.3 °F (36.8 °C), resp. rate 16, SpO2 98 %. Intake and Output:   1901 -  0700  In: -   Out: 1100 [Urine:1100]  No intake/output data recorded. RENE Output lats 24 hrs: No data found. RENE Output last 8 hrs: No data found.   BM over last 24 hrs:   Patient Vitals for the past 24 hrs:   Stool Occurrence(s)   21 0530 1       Exam:   EXAMINATION:     Appearance: well-developed NAD, pleasant    Conjunctiva/Lids: conjunctivae and lids normal   External Ears/Nose: normal no lesions or deformities   Neck: trachea midline   Respiratory Effort: breathing easily   Lower Extremity: no edema   Abdomen/Flank: soft non-tender without masses; no CVA tenderness R hip discomfort    Liver/Spleen: no organomegaly   Hernia: no ventral hernia    Gait/Station: normal   Skin Inspection: warm and dry   Mood/Affect: normal         Labs:  CBC:   Lab Results   Component Value Date/Time    WBC 7.3 2021 04:27 PM    HCT 37.9 2021 04:27 PM    PLATELET 764  04:27 PM     BMP:   Lab Results   Component Value Date/Time    Glucose 92 2021 04:27 PM    Sodium 142 2021 04:27 PM    Potassium 3.8 2021 04:27 PM    Chloride 111 (H) 2021 04:27 PM    CO2 26 2021 04:27 PM    BUN 12 2021 04:27 PM    Creatinine 0.72 2021 04:27 PM Calcium 9.0 12/19/2021 04:27 PM     Cultures: N/A    Imaging:     CT: Results for orders placed during the hospital encounter of 12/18/21    CT ABD PELV W CONT    Narrative  CT dose reduction was achieved through use of a standardized protocol tailored  for this examination and automatic exposure control for dose modulation. Contrast study shows clear lung bases. Liver, spleen, atrophic pancreas, gallbladder and adrenals are normal. Multiple  cysts in the left kidney with multiple stones and calyceal diverticula. Largest  on is 9 mm    Moderate to severe right hydronephrosis with moderate perinephric fat stranding  and likely mild urinary extravasation. Dilated ureter is followed to a 5 mm  stone just below the iliac crossing. 1 mm stone in the kidney. Ureter is quite  tortuous. No small bowel vascular abnormality, lymphadenopathy or free fluid    Uterus is out. Normal bladder. Advanced distal diverticulosis without wall  thickening or fat stranding. No abdominal wall hernia    Degenerative spinal stenosis    Impression  High-grade obstruction from 5 mm distal right ureteral stone    Assessment/Plan:     1. R ureteral stone with high grade hydronephrosis- Normal Cr. , afebrile UA bland . Will keep NPO  Office to arrange R Cysto with laser of stone with possible stent placement . OP follow up to be arranged fro 9 mm L renal stone       Plan discussed with Dr. Welsh Honor By: Fabi Valentino.  Uzair Hanley NP - December 20, 2021

## 2021-12-20 NOTE — PROGRESS NOTES
0745: Bedside and Verbal shift change report given to CRISTOBAL (oncoming nurse) by [facility] (offgoing nurse). Report included the following information SBAR, Kardex, Intake/Output, MAR, Accordion, Recent Results and Med Rec Status.

## 2021-12-20 NOTE — PROGRESS NOTES
[facility]  Hospitalist Group                                                                                          Hospitalist Progress Note  Rene Rodas MD  Answering service: [phone] from in house phone        Date of Service:  2021  NAME:  Genesis Elizabeth  :  1945  MRN:  [id number]    This documentation was facilitated by a Voice Recognition software and may contain inadvertent typographical errors. Admission Summary:   Patient presented with severe right flank pain. CT of the abdomen pelvis showed a high-grade obstruction from 5 mm distal right ureteral stone resulting in moderate to severe right hydronephrosis with moderate perinephric fat stranding and likely mild urinary extravasation. Interval history / Subjective:        Patient this morning says the abdominal pain is much better, she has slight tenderness when she pushes on her right lower abdomen. Denied fever, chills, nausea or vomiting. She has frequent bowel movement which she attributes to the IV fluid as she usually does that when she increases her fluid intake  Assessment & Plan:   Right hydronephrosis due to obstructive nephrolithiasis with perinephric fat stranding.  --Pain control. Flomax. --IV fluid. --Continue IV ceftriaxone. Requested UA  --Plan for cystoscopy with possible lithotripsy and stent placement which is now rescheduled for tomorrow    Pretension: Continue lisinopril, renal indicis are normal.  Hyperlipidemia: On atorvastatin  Hypothyroidism:  On levothyroxine        Code status:full code  DVT prophylaxis: scd  Care Plan discussed with: Patient/Family and Nurse  Anticipated Disposition: Home w/Family  Anticipated Discharge: 24 hours to 48 hours  Hospital Problems  Date Reviewed: 10/13/2020          Codes Class Noted POA    Hydroureter ICD-10-CM: N13.4  ICD-9-CM: 593.5  2021 Unknown                Review of Systems:   A comprehensive review of systems was negative except for that written in the HPI. Vital Signs:    Last 24hrs VS reviewed since prior progress note. Most recent are:  Visit Vitals  BP (!) 139/52 (BP 1 Location: Right upper arm, BP Patient Position: At rest)   Pulse 76   Temp 97.6 °F (36.4 °C)   Resp 16   SpO2 98%         Intake/Output Summary (Last 24 hours) at 12/20/2021 1309  Last data filed at 12/20/2021 0537  Gross per 24 hour   Intake    Output 1100 ml   Net -1100 ml        Physical Examination:     I had a face to face encounter with this patient and independently examined them on12/20/2021 as outlined below:        Constitutional:  No acute distress, cooperative, pleasant    HEENT:  Atraumatic. Oral mucosa moist,. Non icteric sclera. No pallor. Resp:  CTA bilaterally. No wheezing/rhonchi/rales. No accessory muscle use   Chest Wall: No deformity   CV:  Regular rhythm, normal rate, no murmurs, gallops, rubs    GI:  Soft, non distended, non tender.  normoactive bowel sounds, no hepatosplenomegaly   Mild right lower quadrant tenderness without rebound or guarding but no CVA tenderness   :  No CVA or suprapubic tenderness    Musculoskeletal:  No edema, warm, 2+ pulses throughout    Neurologic:  Mental status:AAOx3,   Cranial nerves II-XII : WNL  Motor exam:Moves all extremities symmetrically              Data Review:    Review and/or order of clinical lab test  Review and/or order of tests in the radiology section of CPT  Review and/or order of tests in the medicine section of CPT      Labs:     Recent Labs     12/19/21  1627 12/18/21 2150   WBC 7.3 8.2   HGB 12.5 13.5   HCT 37.9 41.9    196     Recent Labs     12/19/21  1627 12/18/21  2150    143   K 3.8 4.1   * 111*   CO2 26 26   BUN 12 18   CREA 0.72 0.82   GLU 92 124*   CA 9.0 9.6   MG 2.2  --    PHOS 3.0  --      Recent Labs     12/19/21  1627 12/18/21  2150   ALT 23 29   AP 42* 52   TBILI 0.4 0.3   TP 6.2* 6.6   ALB 3.1* 3.5   GLOB 3.1 3.1     No results for input(s): INR, PTP, APTT, INREXT in the last 72 hours. No results for input(s): FE, TIBC, PSAT, FERR in the last 72 hours. No results found for: FOL, RBCF   No results for input(s): PH, PCO2, PO2 in the last 72 hours. No results for input(s): CPK, CKNDX, TROIQ in the last 72 hours.     No lab exists for component: CPKMB  No results found for: CHOL, CHOLX, CHLST, CHOLV, HDL, HDLP, LDL, LDLC, DLDLP, TGLX, TRIGL, TRIGP, CHHD, CHHDX  No results found for: [facility]  Lab Results   Component Value Date/Time    Color Yellow/Straw 12/18/2021 09:53 PM    Appearance Turbid (A) 12/18/2021 09:53 PM    Specific gravity 1.017 12/18/2021 09:53 PM    pH (UA) 5.0 12/18/2021 09:53 PM    Protein 30 (A) 12/18/2021 09:53 PM    Glucose Negative 12/18/2021 09:53 PM    Ketone Negative 12/18/2021 09:53 PM    Bilirubin Negative 12/18/2021 09:53 PM    Urobilinogen 0.1 12/18/2021 09:53 PM    Nitrites Negative 12/18/2021 09:53 PM    Leukocyte Esterase Moderate (A) 12/18/2021 09:53 PM    Bacteria Negative 12/18/2021 09:53 PM    WBC 20-50 12/18/2021 09:53 PM    RBC 10-20 12/18/2021 09:53 PM         Medications Reviewed:     Current Facility-Administered Medications   Medication Dose Route Frequency    lisinopriL (PRINIVIL, ZESTRIL) tablet 5 mg  5 mg Oral DAILY    hydrALAZINE (APRESOLINE) 20 mg/mL injection 10 mg  10 mg IntraVENous Q6H PRN    0.9% sodium chloride infusion  100 mL/hr IntraVENous CONTINUOUS    morphine injection 2 mg  2 mg IntraVENous Q4H PRN    tamsulosin (FLOMAX) capsule 0.4 mg  0.4 mg Oral DAILY    levothyroxine (SYNTHROID) tablet 75 mcg  75 mcg Oral ACB    atorvastatin (LIPITOR) tablet 10 mg  10 mg Oral DAILY    cefTRIAXone (ROCEPHIN) 1 g in sterile water (preservative free) 10 mL IV syringe  1 g IntraVENous Q24H    sodium chloride (NS) flush 5-40 mL  5-40 mL IntraVENous Q8H    sodium chloride (NS) flush 5-40 mL  5-40 mL IntraVENous PRN    acetaminophen (TYLENOL) tablet 650 mg  650 mg Oral Q6H PRN    Or    acetaminophen (TYLENOL) suppository 650 mg  650 mg Rectal Q6H PRN    polyethylene glycol (MIRALAX) packet 17 g  17 g Oral DAILY PRN    ondansetron (ZOFRAN ODT) tablet 4 mg  4 mg Oral Q8H PRN    Or    ondansetron (ZOFRAN) injection 4 mg  4 mg IntraVENous Q6H PRN     ______________________________________________________________________  EXPECTED LENGTH OF STAY: - - -  ACTUAL LENGTH OF STAY:          1                 Michelle Hobbs MD

## 2021-12-20 NOTE — CONSULTS
Urology Consult    Subjective:     Date of Consultation:  December 19, 2021    Referring Physician: Edis Mosher    Reason for Consultation:  Ureteral stone    History of Present Illness:   Patient is a 67 y.o.  female who is being seen for right ureteral stone . She was admitted to the hospital for Hydroureter [N13.4]. She had been seen in the ER at [facility] with RLQ pain 10/10. CT showed a R distal ureteral stone with hydronephrosis. Transferred directly to floor at [facility] apparently and we were called. Patient denies current pain. Denies fevers, chills, nausea or vomiting. She did have some diarrhea one day last week. Reviewed records and has been a patient of Dr. Sallie Espino in the past and has had ESWL several times on the L last year. Creatinine 0.72. WBC 7.3.  UA nitrite negative, 20 to 50 WBC and 10 to 20 RBC. No bacteria.     Past Medical History:   Diagnosis Date    Arthritis     Asthma     Hypercholesterolemia     Hypertension     Kidney stone 7/23/2020    Long term current use of anticoagulant therapy     asa 81mg qd    Microscopic hematuria 7/23/2020    Shoulder fracture, right     NO SURGERY    Thyroid disease     Ureteric stone 7/23/2020    Urethral stricture 7/23/2020    UTI (urinary tract infection) 7/23/2020      Past Surgical History:   Procedure Laterality Date    HX COLONOSCOPY      HX HYSTERECTOMY      HX LITHOTRIPSY      HX LITHOTRIPSY  10/21/2020    HX REFRACTIVE SURGERY Bilateral     CATARACT REMOVAL    HX THYROIDECTOMY      HX TURP      cystoscopy    HX UROLOGICAL        Family History   Problem Relation Age of Onset    Alzheimer's Disease Mother     Heart Disease Father     Cancer Sister     Cancer Brother     Cancer Brother       Social History     Tobacco Use    Smoking status: Never Smoker    Smokeless tobacco: Never Used   Substance Use Topics    Alcohol use: Not Currently     Allergies   Allergen Reactions    Advair Diskus [Fluticasone Propion-Salmeterol] Swelling     Swelling to throat    Mycobacterium Tuberculosis (Tuberculin Ppd) Rash    Cristian-Dur [Theophylline] Other (comments)    Sulfa (Sulfonamide Antibiotics) Hives and Rash    Tuberculin Ppd Other (comments) and Rash      Prior to Admission medications    Medication Sig Start Date End Date Taking? Authorizing Provider   aspirin delayed-release 81 mg tablet Take  by mouth daily. Provider, Historical   atorvastatin (LIPITOR) 10 mg tablet Take  by mouth daily. Provider, Historical   vitamin B complex (B COMPLETE PO) Take  by mouth. Provider, Historical   levothyroxine (SYNTHROID) 75 mcg tablet Take  by mouth Daily (before breakfast). Provider, Historical   lisinopriL (PRINIVIL, ZESTRIL) 5 mg tablet Take  by mouth daily. Provider, Historical   polyethylene glycol (Miralax) 17 gram/dose powder Take 17 g by mouth daily. As needed    Provider, Historical   multivitamin (ONE A DAY) tablet Take 1 Tab by mouth daily. Provider, Historical         Review of Systems:  Pertinent items are noted in HPI. Objective:     Patient Vitals for the past 8 hrs:   BP Temp Pulse Resp SpO2   21 1454 130/73 98.2 °F (36.8 °C) 67 18 96 %     Temp (24hrs), Av.2 °F (36.8 °C), Min:98.2 °F (36.8 °C), Max:98.2 °F (36.8 °C)      Intake and Output:    0701 -  1900  In: -   Out: 300 [Urine:300]    EXAMINATION:     Appearance: well-developed NAD   Conjunctiva/Lids: conjunctivae and lids normal   External Ears/Nose: normal no lesions or deformities   Neck: trachea midline   Respiratory Effort: breathing easily   Lower Extremity: no edema   Abdomen/Flank: soft non-tender without masses; no CVA tenderness   Liver/Spleen: no organomegaly   Hernia: no ventral hernia    Gait/Station: normal   Skin Inspection: warm and dry   Mood/Affect: normal         Assessment:     Active Problems:    Hydroureter (2021)    R distal ureteral stone with hydronephrosis    Plan:     Discussed findiings. Options reviewed including following conservatively, stent, or ureteroscopy. ESWL possible if stone visible, but this would need to be delayed due to ASA. Not toxic or in pain at this time. Will plan to make NPO after midnight and consider intervention tomorrow based on symptoms. Risks and benefits of ureteroscopy, laser, basket, stent reviewed including bleeding, infection, injury to ureter, inability to treat stone, as well as need for stent and further procedure.

## 2021-12-21 ENCOUNTER — ANESTHESIA (OUTPATIENT)
Dept: SURGERY | Age: 76
DRG: 661 | End: 2021-12-21
Payer: MEDICARE

## 2021-12-21 ENCOUNTER — ANESTHESIA EVENT (OUTPATIENT)
Dept: SURGERY | Age: 76
DRG: 661 | End: 2021-12-21
Payer: MEDICARE

## 2021-12-21 ENCOUNTER — APPOINTMENT (OUTPATIENT)
Dept: GENERAL RADIOLOGY | Age: 76
DRG: 661 | End: 2021-12-21
Attending: UROLOGY
Payer: MEDICARE

## 2021-12-21 VITALS
RESPIRATION RATE: 16 BRPM | SYSTOLIC BLOOD PRESSURE: 147 MMHG | TEMPERATURE: 98.2 F | DIASTOLIC BLOOD PRESSURE: 72 MMHG | HEART RATE: 69 BPM | OXYGEN SATURATION: 97 %

## 2021-12-21 PROCEDURE — 77030006974 HC BSKT URET RTVR BSC -C: Performed by: UROLOGY

## 2021-12-21 PROCEDURE — 88300 SURGICAL PATH GROSS: CPT

## 2021-12-21 PROCEDURE — 74420 UROGRAPHY RTRGR +-KUB: CPT

## 2021-12-21 PROCEDURE — 82360 CALCULUS ASSAY QUANT: CPT

## 2021-12-21 PROCEDURE — 74011250637 HC RX REV CODE- 250/637: Performed by: UROLOGY

## 2021-12-21 PROCEDURE — 74011000250 HC RX REV CODE- 250: Performed by: UROLOGY

## 2021-12-21 PROCEDURE — 76210000006 HC OR PH I REC 0.5 TO 1 HR: Performed by: UROLOGY

## 2021-12-21 PROCEDURE — 77030026438 HC STYL ET INTUB CARD -A: Performed by: ANESTHESIOLOGY

## 2021-12-21 PROCEDURE — 74011250636 HC RX REV CODE- 250/636: Performed by: NURSE ANESTHETIST, CERTIFIED REGISTERED

## 2021-12-21 PROCEDURE — C1758 CATHETER, URETERAL: HCPCS | Performed by: UROLOGY

## 2021-12-21 PROCEDURE — 77030040361 HC SLV COMPR DVT MDII -B: Performed by: UROLOGY

## 2021-12-21 PROCEDURE — 77030040922 HC BLNKT HYPOTHRM STRY -A

## 2021-12-21 PROCEDURE — 74011000636 HC RX REV CODE- 636: Performed by: UROLOGY

## 2021-12-21 PROCEDURE — 74011250637 HC RX REV CODE- 250/637: Performed by: HOSPITALIST

## 2021-12-21 PROCEDURE — C2617 STENT, NON-COR, TEM W/O DEL: HCPCS | Performed by: UROLOGY

## 2021-12-21 PROCEDURE — 74011000250 HC RX REV CODE- 250: Performed by: NURSE ANESTHETIST, CERTIFIED REGISTERED

## 2021-12-21 PROCEDURE — 77030019927 HC TBNG IRR CYSTO BAXT -A: Performed by: UROLOGY

## 2021-12-21 PROCEDURE — 76060000033 HC ANESTHESIA 1 TO 1.5 HR: Performed by: UROLOGY

## 2021-12-21 PROCEDURE — 74011250636 HC RX REV CODE- 250/636: Performed by: UROLOGY

## 2021-12-21 PROCEDURE — 0T768DZ DILATION OF RIGHT URETER WITH INTRALUMINAL DEVICE, VIA NATURAL OR ARTIFICIAL OPENING ENDOSCOPIC: ICD-10-PCS | Performed by: UROLOGY

## 2021-12-21 PROCEDURE — 77030020034 HC FBR LSR HOLM BSC -F: Performed by: UROLOGY

## 2021-12-21 PROCEDURE — C1769 GUIDE WIRE: HCPCS | Performed by: UROLOGY

## 2021-12-21 PROCEDURE — 76010000160 HC OR TIME 0.5 TO 1 HR INTENSV-TIER 1: Performed by: UROLOGY

## 2021-12-21 PROCEDURE — 2709999900 HC NON-CHARGEABLE SUPPLY: Performed by: UROLOGY

## 2021-12-21 PROCEDURE — 0TC68ZZ EXTIRPATION OF MATTER FROM RIGHT URETER, VIA NATURAL OR ARTIFICIAL OPENING ENDOSCOPIC: ICD-10-PCS | Performed by: UROLOGY

## 2021-12-21 PROCEDURE — 77030008684 HC TU ET CUF COVD -B: Performed by: ANESTHESIOLOGY

## 2021-12-21 RX ORDER — SODIUM CHLORIDE 9 MG/ML
25 INJECTION, SOLUTION INTRAVENOUS CONTINUOUS
Status: DISCONTINUED | OUTPATIENT
Start: 2021-12-21 | End: 2021-12-21 | Stop reason: HOSPADM

## 2021-12-21 RX ORDER — SODIUM CHLORIDE 0.9 % (FLUSH) 0.9 %
5-40 SYRINGE (ML) INJECTION EVERY 8 HOURS
Status: DISCONTINUED | OUTPATIENT
Start: 2021-12-21 | End: 2021-12-21 | Stop reason: HOSPADM

## 2021-12-21 RX ORDER — SODIUM CHLORIDE 0.9 % (FLUSH) 0.9 %
5-40 SYRINGE (ML) INJECTION AS NEEDED
Status: DISCONTINUED | OUTPATIENT
Start: 2021-12-21 | End: 2021-12-21 | Stop reason: HOSPADM

## 2021-12-21 RX ORDER — PROPOFOL 10 MG/ML
INJECTION, EMULSION INTRAVENOUS AS NEEDED
Status: DISCONTINUED | OUTPATIENT
Start: 2021-12-21 | End: 2021-12-21 | Stop reason: HOSPADM

## 2021-12-21 RX ORDER — TAMSULOSIN HYDROCHLORIDE 0.4 MG/1
0.4 CAPSULE ORAL DAILY
Qty: 10 CAPSULE | Refills: 0 | Status: SHIPPED | OUTPATIENT
Start: 2021-12-21

## 2021-12-21 RX ORDER — HYDROMORPHONE HYDROCHLORIDE 1 MG/ML
0.2 INJECTION, SOLUTION INTRAMUSCULAR; INTRAVENOUS; SUBCUTANEOUS
Status: DISCONTINUED | OUTPATIENT
Start: 2021-12-21 | End: 2021-12-21 | Stop reason: HOSPADM

## 2021-12-21 RX ORDER — SUCCINYLCHOLINE CHLORIDE 20 MG/ML
INJECTION INTRAMUSCULAR; INTRAVENOUS AS NEEDED
Status: DISCONTINUED | OUTPATIENT
Start: 2021-12-21 | End: 2021-12-21 | Stop reason: HOSPADM

## 2021-12-21 RX ORDER — SODIUM CHLORIDE, SODIUM LACTATE, POTASSIUM CHLORIDE, CALCIUM CHLORIDE 600; 310; 30; 20 MG/100ML; MG/100ML; MG/100ML; MG/100ML
INJECTION, SOLUTION INTRAVENOUS
Status: DISCONTINUED | OUTPATIENT
Start: 2021-12-21 | End: 2021-12-21 | Stop reason: HOSPADM

## 2021-12-21 RX ORDER — LEVOFLOXACIN 500 MG/1
500 TABLET, FILM COATED ORAL DAILY
Qty: 1 TABLET | Refills: 1 | Status: SHIPPED | OUTPATIENT
Start: 2021-12-21

## 2021-12-21 RX ORDER — SODIUM CHLORIDE, SODIUM LACTATE, POTASSIUM CHLORIDE, CALCIUM CHLORIDE 600; 310; 30; 20 MG/100ML; MG/100ML; MG/100ML; MG/100ML
25 INJECTION, SOLUTION INTRAVENOUS CONTINUOUS
Status: DISCONTINUED | OUTPATIENT
Start: 2021-12-21 | End: 2021-12-21 | Stop reason: HOSPADM

## 2021-12-21 RX ORDER — FENTANYL CITRATE 50 UG/ML
50 INJECTION, SOLUTION INTRAMUSCULAR; INTRAVENOUS AS NEEDED
Status: DISCONTINUED | OUTPATIENT
Start: 2021-12-21 | End: 2021-12-21 | Stop reason: HOSPADM

## 2021-12-21 RX ORDER — FENTANYL CITRATE 50 UG/ML
INJECTION, SOLUTION INTRAMUSCULAR; INTRAVENOUS AS NEEDED
Status: DISCONTINUED | OUTPATIENT
Start: 2021-12-21 | End: 2021-12-21 | Stop reason: HOSPADM

## 2021-12-21 RX ORDER — MIDAZOLAM HYDROCHLORIDE 1 MG/ML
1 INJECTION, SOLUTION INTRAMUSCULAR; INTRAVENOUS AS NEEDED
Status: DISCONTINUED | OUTPATIENT
Start: 2021-12-21 | End: 2021-12-21 | Stop reason: HOSPADM

## 2021-12-21 RX ORDER — MORPHINE SULFATE 2 MG/ML
2 INJECTION, SOLUTION INTRAMUSCULAR; INTRAVENOUS
Status: DISCONTINUED | OUTPATIENT
Start: 2021-12-21 | End: 2021-12-21 | Stop reason: HOSPADM

## 2021-12-21 RX ORDER — SODIUM CHLORIDE, SODIUM LACTATE, POTASSIUM CHLORIDE, CALCIUM CHLORIDE 600; 310; 30; 20 MG/100ML; MG/100ML; MG/100ML; MG/100ML
100 INJECTION, SOLUTION INTRAVENOUS CONTINUOUS
Status: DISCONTINUED | OUTPATIENT
Start: 2021-12-21 | End: 2021-12-21 | Stop reason: HOSPADM

## 2021-12-21 RX ORDER — LIDOCAINE HYDROCHLORIDE 20 MG/ML
INJECTION, SOLUTION EPIDURAL; INFILTRATION; INTRACAUDAL; PERINEURAL AS NEEDED
Status: DISCONTINUED | OUTPATIENT
Start: 2021-12-21 | End: 2021-12-21 | Stop reason: HOSPADM

## 2021-12-21 RX ORDER — ONDANSETRON 2 MG/ML
4 INJECTION INTRAMUSCULAR; INTRAVENOUS AS NEEDED
Status: DISCONTINUED | OUTPATIENT
Start: 2021-12-21 | End: 2021-12-21 | Stop reason: HOSPADM

## 2021-12-21 RX ORDER — ROPIVACAINE HYDROCHLORIDE 5 MG/ML
30 INJECTION, SOLUTION EPIDURAL; INFILTRATION; PERINEURAL AS NEEDED
Status: DISCONTINUED | OUTPATIENT
Start: 2021-12-21 | End: 2021-12-21 | Stop reason: HOSPADM

## 2021-12-21 RX ORDER — ONDANSETRON 2 MG/ML
INJECTION INTRAMUSCULAR; INTRAVENOUS AS NEEDED
Status: DISCONTINUED | OUTPATIENT
Start: 2021-12-21 | End: 2021-12-21 | Stop reason: HOSPADM

## 2021-12-21 RX ORDER — OXYCODONE HYDROCHLORIDE 5 MG/1
5 TABLET ORAL AS NEEDED
Status: DISCONTINUED | OUTPATIENT
Start: 2021-12-21 | End: 2021-12-21 | Stop reason: HOSPADM

## 2021-12-21 RX ORDER — MIDAZOLAM HYDROCHLORIDE 1 MG/ML
0.5 INJECTION, SOLUTION INTRAMUSCULAR; INTRAVENOUS
Status: DISCONTINUED | OUTPATIENT
Start: 2021-12-21 | End: 2021-12-21 | Stop reason: HOSPADM

## 2021-12-21 RX ORDER — LIDOCAINE HYDROCHLORIDE 10 MG/ML
0.1 INJECTION, SOLUTION EPIDURAL; INFILTRATION; INTRACAUDAL; PERINEURAL AS NEEDED
Status: DISCONTINUED | OUTPATIENT
Start: 2021-12-21 | End: 2021-12-21 | Stop reason: HOSPADM

## 2021-12-21 RX ORDER — FENTANYL CITRATE 50 UG/ML
25 INJECTION, SOLUTION INTRAMUSCULAR; INTRAVENOUS
Status: DISCONTINUED | OUTPATIENT
Start: 2021-12-21 | End: 2021-12-21 | Stop reason: HOSPADM

## 2021-12-21 RX ORDER — ACETAMINOPHEN 325 MG/1
650 TABLET ORAL ONCE
Status: DISCONTINUED | OUTPATIENT
Start: 2021-12-21 | End: 2021-12-21 | Stop reason: HOSPADM

## 2021-12-21 RX ORDER — ROCURONIUM BROMIDE 10 MG/ML
INJECTION, SOLUTION INTRAVENOUS AS NEEDED
Status: DISCONTINUED | OUTPATIENT
Start: 2021-12-21 | End: 2021-12-21 | Stop reason: HOSPADM

## 2021-12-21 RX ORDER — DIPHENHYDRAMINE HYDROCHLORIDE 50 MG/ML
12.5 INJECTION, SOLUTION INTRAMUSCULAR; INTRAVENOUS AS NEEDED
Status: DISCONTINUED | OUTPATIENT
Start: 2021-12-21 | End: 2021-12-21 | Stop reason: HOSPADM

## 2021-12-21 RX ADMIN — FENTANYL CITRATE 50 MCG: 50 INJECTION, SOLUTION INTRAMUSCULAR; INTRAVENOUS at 11:20

## 2021-12-21 RX ADMIN — ROCURONIUM BROMIDE 5 MG: 10 SOLUTION INTRAVENOUS at 11:20

## 2021-12-21 RX ADMIN — LIDOCAINE HYDROCHLORIDE 60 MG: 20 INJECTION, SOLUTION EPIDURAL; INFILTRATION; INTRACAUDAL; PERINEURAL at 11:20

## 2021-12-21 RX ADMIN — PHENYLEPHRINE HYDROCHLORIDE 40 MCG/MIN: 10 INJECTION INTRAVENOUS at 11:20

## 2021-12-21 RX ADMIN — ATORVASTATIN CALCIUM 10 MG: 10 TABLET, FILM COATED ORAL at 14:40

## 2021-12-21 RX ADMIN — LISINOPRIL 5 MG: 5 TABLET ORAL at 14:40

## 2021-12-21 RX ADMIN — PROPOFOL 40 MG: 10 INJECTION, EMULSION INTRAVENOUS at 11:55

## 2021-12-21 RX ADMIN — TAMSULOSIN HYDROCHLORIDE 0.4 MG: 0.4 CAPSULE ORAL at 14:40

## 2021-12-21 RX ADMIN — ONDANSETRON HYDROCHLORIDE 4 MG: 2 INJECTION, SOLUTION INTRAMUSCULAR; INTRAVENOUS at 11:48

## 2021-12-21 RX ADMIN — PROPOFOL 130 MG: 10 INJECTION, EMULSION INTRAVENOUS at 11:20

## 2021-12-21 RX ADMIN — SODIUM CHLORIDE 100 ML/HR: 900 INJECTION, SOLUTION INTRAVENOUS at 12:30

## 2021-12-21 RX ADMIN — CEFTRIAXONE SODIUM 1 G: 1 INJECTION, POWDER, FOR SOLUTION INTRAMUSCULAR; INTRAVENOUS at 17:45

## 2021-12-21 RX ADMIN — SUCCINYLCHOLINE CHLORIDE 100 MG: 20 INJECTION, SOLUTION INTRAMUSCULAR; INTRAVENOUS at 11:20

## 2021-12-21 RX ADMIN — PROPOFOL 30 MG: 10 INJECTION, EMULSION INTRAVENOUS at 11:51

## 2021-12-21 RX ADMIN — LEVOTHYROXINE SODIUM 75 MCG: 0.07 TABLET ORAL at 07:34

## 2021-12-21 RX ADMIN — SODIUM CHLORIDE, POTASSIUM CHLORIDE, SODIUM LACTATE AND CALCIUM CHLORIDE: 600; 310; 30; 20 INJECTION, SOLUTION INTRAVENOUS at 11:06

## 2021-12-21 NOTE — ROUTINE PROCESS
Patient: Mey Yun MRN: [id number]  SSN: [id number]   YOB: 1945  Age: 67 y.o. Sex: female     Patient is status post Procedure(s):  CYSTOSCOPY RIGHT RETROGRADE, RIGHT URETEROSCOPY WITH HOLMIUM LASER AND STENT PLACEMENT (URGENT). Surgeon(s) and Role:     * Irma Patel MD - Primary    Local/Dose/Irrigation:  glydo lidocaine jelly 2%                  Peripheral IV 12/20/21 Anterior; Left Forearm (Active)   Site Assessment Clean, dry, & intact 12/21/21 1026   Phlebitis Assessment 0 12/21/21 1026   Infiltration Assessment 0 12/21/21 1026   Dressing Status Clean, dry, & intact 12/21/21 1026   Dressing Type Transparent 12/21/21 1026   Hub Color/Line Status Blue 12/21/21 1026   Action Taken Other (comment) 12/21/21 1026   Alcohol Cap Used Yes 12/21/21 1026          Nephroureteral Drain 12/21/21 Right Ureter (Active)

## 2021-12-21 NOTE — OP NOTES
Preoperative Diagnosis: Right distal ureteral calculus    Postoperative Diagnosis:  Same    Procedure(s) Performed:    1. Cystourethroscopy  2. Right ureteroscopic stone extraction with holmium laser lithotripsy  3. Right retrograde pyelogram  4. Right ureteral stent placement, 5Fr x 26cm JJ ureteral stent with dangler  5. Intraoperative fluoroscopy with interpretation <1hr.     Surgeon:  Adela Yun MD    Assistant(s):  SA    Anesthesia:  General    Fluids:  See anesthesia record    Estimated blood loss:  <5 mL    Specimens:  Right distal ureteral calculus for stone analysis    Cultures:  None    Implants: None    Tubes/Lines/Drains:  RIGHT 5Fr x 26cm JJ ureteral stent with dangler    Complications:  None    Indications: 67 y.o. female patient with a history of OA, HTN, nephrolithiasis transferred to [facility] due to uncontrolled renal colic in the setting of an obstructing distal right ureteral calculus. She met with my colleague, Dr. Allison Retana, who recommend right CRULS. Risks & benefits of the procedure discussed with the patient, who wishes to proceed. Findings:  Normal cystourethroscopy. Minor cystocele. Impacted distal right ureteral stone treated and removed. Moderate proximal right hydroureteronephrosis. Successful treatment of all right ureteral stone burden. Successful right stent placement, string tucked in vagina. Radiologic Interpretation of Retrograde Pyelogram:  Right retrograde pyelogram demonstrated no contrast extravasation, filling defects with moderate right hydroureteronephrosis proximal to the stone affecting the right mid & proximal ureter and kidney, with kinking & tortuosity of the ureter. Description:  The patient was correctly identified in the preop holding area where written informed consent as well as potential risks and complications were reviewed. The patient agreed. They were brought back to the operative suite.  Once correct information was verified, general anesthesia was induced. They were then gently placed into dorsal lithotomy position with SCDs in place for VTE prophylaxis. They were prepped and draped in the usual sterile fashion and given appropriate preoperative antibiotics with ceftriaxone (she had been receiving this scheduled on the floor). A surgical timeout was then performed. We inserted a 21F rigid cystoscope per urethra with copious lubrication and normal saline irrigation running. This demonstrated findings as described above. There were no bladder or urethral mucosal irregularities, stones, foreign bodies nor tumors noted. We cannulated the right ureteral orifice with the combination of a sensor wire and 5Fr open ended catheter and the sensor wire was advanced into the right distal ureter. We were unable to pass this proximal to the right distal ureteral stone at first. We then removed the cystoscope leaving our sensor wire in place. We then advanced a dual channel semirigid ureteroscope and passed this along side the sensor wire and performed distal ureteroscopy which showed an impacted right distal ureteral stone. I was still unable to pass the wire proximal to the stone and made the decision to perform careful ureteroscopy of the stone. We then obtained a 270 micron holmium laser fiber and performed laser lithotripsy until all of the stone burden was fragmented into several small fragments which were basket extracted using a zero-tip nitinol wire basket without complication to the bladder. Along the way we were able to pass the wire proximally into the right proximal ureter. Stone fragments were sent for chemical analysis. We then re-explored the location of stone treatment which demonstrated no further stone presence. I was able to carefully maneuver the semirigid ureteroscope all the way to the right renal pelvis. The wire was repositioned through the ureteroscope into the right kidney at this point under direct visual guidance.  There were no other stones noted within the right ureter. The right ureter was tortuous with several folds & kinks. At this point we elected to leave a ureteral stent and withdrew our instruments leaving a sensor wire in place. The bladder was filled and emptied and stone fragments were retrieved and sent for analysis. We then advanced a 5Fr x 26 cm JJ ureteral stent with dangler with the assistance of a stent pusher under direct fluoroscopic & visual guidance without difficultly. Sensor wire removal demonstrated satisfactory stent curl proximally in the renal pelvis and distally in the bladder. The bladder was emptied and all instrumentation was removed. 2% viscous lidocaine urojet was instilled in the urethra. The stent string was cut to an appropriate length and tucked into the vagina. The patient was woken up from anesthesia and taken to the recovery unit for routine postoperative care. Post Op Plan:    -- Return to floor for routine postop care.  Clear liquid diet, ADAT  -- OK to discharge from urologic perspective as early as this afternoon  -- Needs urologic follow up with KUB and stent removal in about a week    Gail Evans MD  [facility]

## 2021-12-21 NOTE — ANESTHESIA POSTPROCEDURE EVALUATION
Post-Anesthesia Evaluation and Assessment    Patient: Jefferson Dixon MRN: [id number]  SSN: [id number]    YOB: 1945  Age: 67 y.o. Sex: female      I have evaluated the patient and they are stable and ready for discharge from the PACU. Cardiovascular Function/Vital Signs  Visit Vitals  BP (!) 164/82 (BP 1 Location: Right upper arm, BP Patient Position: At rest)   Pulse 66   Temp 36.8 °C (98.3 °F)   Resp 18   SpO2 98%       Patient is status post General anesthesia for Procedure(s):  CYSTOSCOPY RIGHT RETROGRADE, RIGHT URETEROSCOPY WITH HOLMIUM LASER AND STENT PLACEMENT (URGENT). Nausea/Vomiting: None    Postoperative hydration reviewed and adequate. Pain:  Pain Scale 1: Numeric (0 - 10) (12/21/21 1420)  Pain Intensity 1: 0 (12/21/21 1420)   Managed    Neurological Status:   Neuro (WDL): Within Defined Limits (12/21/21 1230)   At baseline    Mental Status, Level of Consciousness: Alert and  oriented to person, place, and time    Pulmonary Status:   O2 Device: None (Room air) (12/21/21 1230)   Adequate oxygenation and airway patent    Complications related to anesthesia: None    Post-anesthesia assessment completed. No concerns    Signed By: Nely Villeda MD     December 21, 2021              Procedure(s):  CYSTOSCOPY RIGHT RETROGRADE, RIGHT URETEROSCOPY WITH HOLMIUM LASER AND STENT PLACEMENT (URGENT). general    <BSHSIANPOST>    INITIAL Post-op Vital signs:   Vitals Value Taken Time   /70 12/21/21 1300   Temp 36.9 °C (98.4 °F) 12/21/21 1230   Pulse 72 12/21/21 1307   Resp 19 12/21/21 1307   SpO2 98 % 12/21/21 1307   Vitals shown include unvalidated device data.

## 2021-12-21 NOTE — PROGRESS NOTES
Progress Note             Signed By: Shelley Mclean NP - 2021                         Progress Note    Patient: Beatris James MRN: [id number]  SSN: [id number]    YOB: 1945 Age: 67 y.o. Sex: female   Height:   Weight:   BMI: There is no height or weight on file to calculate BMI.    Emergency  Contact:  Primary Emergency Contact: Aguila,Gaby, Krzysztof Phone: [phone]   PCP:   [facility], [facility] [address] Day: 3 - Admitted 2021  3:09 PM by Isa Duane, MD - Full Code  Active Hospital Problems    Diagnosis Date Noted    Hydroureter 2021    Day of Surgery Procedure(s):  CYSTOSCOPY RIGHT RETROGRADE, RIGHT URETEROSCOPY WITH HOLMIUM LASER AND STENT PLACEMENT (URGENT)  Surgeon(s):  Dave Herrera MD         Assessment/Plan:      - 5 mm R ureteral stone - scheduled for CRULS today   - keep NPO   - OP follow up fro 9 mm renal stone      Subjective: 5 mm ureteral stone associated with pain n/v   Afebrile , wbc 7.3 vss    Imaging: N/A   Exam: EXAMINATION:     Appearance: well-developed NAD   Conjunctiva/Lids: conjunctivae and lids normal   External Ears/Nose: normal no lesions or deformities   Neck: trachea midline   Respiratory Effort: breathing easily   Lower Extremity: no edema   Abdomen/Flank: soft non-tender without masses; no CVA tenderness R hip pain    Liver/Spleen: no organomegaly   Hernia: no ventral hernia    Gait/Station: normal   Skin Inspection: warm and dry   Mood/Affect: normal        ROS:  Denies Chest Pain, SOB         Labs: Recent Labs     21  1627 21  2150   WBC 7.3 8.2   HGB 12.5 13.5   HCT 37.9 41.9    196     Recent Labs     21  1627 21  2150    143   K 3.8 4.1   * 111*   CO2 26 26   GLU 92 124*   BUN 12 18   CREA 0.72 0.82   CA 9.0 9.6   MG 2.2  --    PHOS 3.0  --       ID: Temp (24hrs), Av °F (36.7 °C), Min:98 °F (36.7 °C), Max:98.1 °F (36.7 °C)    12/18/2021: WBC 8.2 K/uL (Ref range: 3.6 - 11.0 K/uL)  12/19/2021: WBC 7.3 K/uL (Ref range: 3.6 - 11.0 K/uL)  Current Antimicrobial Therapy (168h ago, onward)     Ordered     Start Stop    12/19/21 1519  cefTRIAXone (ROCEPHIN) 1 g in sterile water (preservative free) 10 mL IV syringe  1 g,   IntraVENous,   EVERY 24 HOURS        References:    Lexicomp    12/19/21 1600 12/24/21 1559              Cultures: All Micro Results     Procedure Component Value Units Date/Time    COVID-19 RAPID TEST [[id number]] Collected: 12/20/21 0915    Order Status: Completed Specimen: Nasopharyngeal Updated: 12/20/21 0945     Specimen source Nasopharyngeal        COVID-19 rapid test Not detected        Comment: Rapid Abbott ID Now       Rapid NAAT:  The specimen is NEGATIVE for SARS-CoV-2, the novel coronavirus associated with COVID-19. Negative results should be treated as presumptive and, if inconsistent with clinical signs and symptoms or necessary for patient management, should be tested with an alternative molecular assay. Negative results do not preclude SARS-CoV-2 infection and should not be used as the sole basis for patient management decisions. This test has been authorized by the FDA under an Emergency Use Authorization (EUA) for use by authorized laboratories. Fact sheet for Healthcare Providers: ConventionUpdate.co.nz  Fact sheet for Patients: ConventionUpdate.co.nz       Methodology: Isothermal Nucleic Acid Amplification              GI: Intake: DIET ONE TIME MESSAGE  DIET NPO   Appetite: NPO          Abdominal Assessment: Soft,Tender  Bowel Sounds:  Active  Last Bowel Movement Date: 12/20/21 Patient Vitals for the past 168 hrs:   Stool Occurrence(s)   12/20/21 0530 1         Pain: 0/10   -   -        Current Analgesic Therapy (168h ago, onward)     Ordered     Start Stop    12/19/21 1520  acetaminophen (TYLENOL) tablet 650 mg  650 mg,   Oral,   EVERY 6 HOURS AS NEEDED        References:    Lexicomp   \"Or\" Linked Group Details    12/19/21 1519 --    12/19/21 1520  acetaminophen (TYLENOL) suppository 650 mg  650 mg,   Rectal,   EVERY 6 HOURS AS NEEDED        References:    Lexicomp   \"Or\" Linked Group Details    12/19/21 1519 --    12/19/21 1518  morphine injection 2 mg  2 mg,   IntraVENous,   EVERY 4 HOURS AS NEEDED        References:    Lexicomp    12/19/21 1517 --               :      -      12/18/2021: Creatinine 0.82 mg/dL (Ref range: 0.55 - 1.02 mg/dL)  12/19/2021: Creatinine 0.72 MG/DL (Ref range: 0.55 - 1.02 MG/DL)       Vitals: O2 Device: None (Room air) @    Patient Vitals for the past 24 hrs:   BP Temp Pulse Resp SpO2   12/21/21 0025 131/66 98 °F (36.7 °C) 72 18 96 %   12/20/21 2039 131/81 98.1 °F (36.7 °C) 74 18 96 %   12/20/21 1448 122/70 98 °F (36.7 °C) 76 18 97 %      I&O's:    Date 12/20/21 0700 - 12/21/21 0659 12/21/21 0700 - 12/22/21 0659   Shift 4818-0601 6202-8454 24 Hour Total 4361-9199 6116-8183 24 Hour Total   INTAKE   Shift Total         OUTPUT   Urine         Urine Voided       Shift Total       NET -300 -800 -1100      Weight (kg)             Meds:    Current Facility-Administered Medications:     lisinopriL (PRINIVIL, ZESTRIL) tablet 5 mg, 5 mg, Oral, DAILY, Car Cates NP, 5 mg at 12/20/21 8900    hydrALAZINE (APRESOLINE) 20 mg/mL injection 10 mg, 10 mg, IntraVENous, Q6H PRN, John Daley NP, 10 mg at 12/20/21 0452    0.9% sodium chloride infusion, 100 mL/hr, IntraVENous, CONTINUOUS, Rosalie Kc MD, Last Rate: 100 mL/hr at 12/20/21 0537, 100 mL/hr at 12/20/21 0537    morphine injection 2 mg, 2 mg, IntraVENous, Q4H PRN, Leighann Pedraza MD    tamsulosin [facility]) capsule 0.4 mg, 0.4 mg, Oral, DAILY, Leighann Pedraza MD, 0.4 mg at 12/20/21 6161    levothyroxine (SYNTHROID) tablet 75 mcg, 75 mcg, Oral, ACB, Rosalie Kc MD, 75 mcg at 12/21/21 0734    atorvastatin (LIPITOR) tablet 10 mg, 10 mg, Oral, DAILY, Sun, Kinyarwanda, MD, 10 mg at 12/20/21 9302    cefTRIAXone (ROCEPHIN) 1 g in sterile water (preservative free) 10 mL IV syringe, 1 g, IntraVENous, Q24H, Leighann Pedraza MD, 1 g at 12/20/21 1549    sodium chloride (NS) flush 5-40 mL, 5-40 mL, IntraVENous, Q8H, Leighann Pedraza MD, 10 mL at 12/20/21 1400    sodium chloride (NS) flush 5-40 mL, 5-40 mL, IntraVENous, PRN, Leighann Pedraza MD    acetaminophen (TYLENOL) tablet 650 mg, 650 mg, Oral, Q6H PRN **OR** acetaminophen (TYLENOL) suppository 650 mg, 650 mg, Rectal, Q6H PRN, Leighann Pedraza MD    polyethylene glycol (MIRALAX) packet 17 g, 17 g, Oral, DAILY PRN, Leighann Pedraza MD    ondansetron (ZOFRAN ODT) tablet 4 mg, 4 mg, Oral, Q8H PRN **OR** ondansetron (ZOFRAN) injection 4 mg, 4 mg, IntraVENous, Q6H PRN, Edy Dixon MD          Signed By: Radha Chong.  Sulema Jaquez, RODOLFO - December 21, 2021

## 2021-12-21 NOTE — PROGRESS NOTES
Bedside and Verbal shift change report given to Charity Reid RN (oncoming nurse) by ARNAV Tejada RN (offgoing nurse). Report included the following information SBAR, Kardex, Intake/Output, MAR and Recent Results.

## 2021-12-21 NOTE — PROGRESS NOTES
1541 Verbal shift change report given to Eric Powell RN (oncoming nurse) by Renee Car RN (offgoing nurse). Report included the following information SBAR, Kardex, Procedure Summary, Intake/Output, MAR, Accordion, Recent Results and Med Rec Status.

## 2021-12-21 NOTE — ANESTHESIA PREPROCEDURE EVALUATION
Relevant Problems   RENAL FAILURE   (+) Calculus of kidney       Anesthetic History   No history of anesthetic complications            Review of Systems / Medical History  Patient summary reviewed, nursing notes reviewed and pertinent labs reviewed    Pulmonary            Asthma        Neuro/Psych   Within defined limits           Cardiovascular    Hypertension          Hyperlipidemia         GI/Hepatic/Renal                Endo/Other      Hypothyroidism  Arthritis     Other Findings   Comments: Hematuria  Kidney  stones           Physical Exam    Airway  Mallampati: II  TM Distance: > 6 cm  Neck ROM: normal range of motion   Mouth opening: Normal     Cardiovascular    Rhythm: regular  Rate: normal         Dental         Pulmonary  Breath sounds clear to auscultation               Abdominal         Other Findings            Anesthetic Plan    ASA: 2  Anesthesia type: general          Induction: Intravenous  Anesthetic plan and risks discussed with: Patient and Son / Daughter

## 2021-12-21 NOTE — DISCHARGE SUMMARY
Discharge Summary       PATIENT ID: Tony Bennett  MRN: [id number]   YOB: 1945    DATE OF ADMISSION: 12/19/2021  3:09 PM    DATE OF DISCHARGE: 12/21/21     PRIMARY CARE PROVIDER: Alice Page DO     ATTENDING PHYSICIAN: Leigh Tijerina MD    DISCHARGING PROVIDER: Leigh Tijerina MD    To contact this individual call [phone] and ask the  to page. If unavailable ask to be transferred the Adult Hospitalist Department. CONSULTATIONS: IP CONSULT TO UROLOGY    PROCEDURES/SURGERIES: Procedure(s):  CYSTOSCOPY RIGHT RETROGRADE, RIGHT URETEROSCOPY WITH HOLMIUM LASER AND STENT PLACEMENT (URGENT)    [address] COURSE:   Right hydronephrosis due to obstructive nephrolithiasis with perinephric fat stranding. No evidence of infection. Patient did not have fever, chills. WBC was normal.  UA was negative. No indication for ongoing antibiotic use. Patient was treated with cystoscopy, lithotripsy and stent placement. Postprocedure, patient felt well and wanted to go home. Urology okay for discharge if uneventful post procedure. Pretension: Continue lisinopril, renal indicis are normal.  Hyperlipidemia: On atorvastatin  Hypothyroidism: On levothyroxine              PENDING TEST RESULTS:   At the time of discharge the following test results are still pending: None    FOLLOW UP APPOINTMENTS:    Follow-up Information     Follow up With Specialties Details Why Contact Info    Sofia Turner MD Urology On 12/28/2021 0800 at [facility]  [place] [address] [facility]  [address] [address], [place], [address] [place] [address]  [phone]               DIET: Regular Diet    ACTIVITY: Activity as tolerated          DISCHARGE MEDICATIONS:  Current Discharge Medication List      START taking these medications    Details   levoFLOXacin (LEVAQUIN) 500 mg tablet Take 1 Tablet by mouth daily.  Bring with you to your clinic visit  Qty: 1 Tablet, Refills: 1  Start date: 12/21/2021      tamsulosin (Flomax) 0.4 mg capsule Take 1 Capsule by mouth daily. Qty: 10 Capsule, Refills: 0  Start date: 12/21/2021         CONTINUE these medications which have NOT CHANGED    Details   aspirin delayed-release 81 mg tablet Take  by mouth daily. atorvastatin (LIPITOR) 10 mg tablet Take  by mouth daily. vitamin B complex (B COMPLETE PO) Take  by mouth.      levothyroxine (SYNTHROID) 75 mcg tablet Take  by mouth Daily (before breakfast). lisinopriL (PRINIVIL, ZESTRIL) 5 mg tablet Take  by mouth daily. polyethylene glycol (Miralax) 17 gram/dose powder Take 17 g by mouth daily. As needed      multivitamin (ONE A DAY) tablet Take 1 Tab by mouth daily. NOTIFY YOUR PHYSICIAN FOR ANY OF THE FOLLOWING:   Fever over 101 degrees for 24 hours. Chest pain, shortness of breath, fever, chills, nausea, vomiting, diarrhea, change in mentation, falling, weakness, bleeding. Severe pain or pain not relieved by medications. Or, any other signs or symptoms that you may have questions about. DISPOSITION:  x  Home With:   OT  PT  HH  RN       Long term SNF/Inpatient Rehab    Independent/assisted living    Hospice    Other:       PATIENT CONDITION AT DISCHARGE:     Functional status    Poor     Deconditioned    x Independent      Cognition   x  Lucid     Forgetful     Dementia      Catheters/lines (plus indication)    Jiménez     PICC     PEG    x None      Code status   x  Full code     DNR      PHYSICAL EXAMINATION AT DISCHARGE:  General:          Alert, cooperative, no distress, appears stated age. HEENT:           Atraumatic, anicteric sclerae, pink conjunctivae                          No oral ulcers, mucosa moist, throat clear, dentition fair  Neck:               Supple, symmetrical  Lungs:             Clear to auscultation bilaterally. No Wheezing or Rhonchi. No rales.   Chest wall:      No tenderness  No Accessory muscle use. Heart:              Regular  rhythm,  No  murmur   No edema  Abdomen:        Soft, non-tender. Not distended. Bowel sounds normal    No abdominal or flank tenderness. Extremities:     No cyanosis. No clubbing,                            Skin turgor normal, Capillary refill normal  Skin:                Not pale. Not Jaundiced  No rashes   Psych:             Not anxious or agitated. Neurologic:      Alert, moves all extremities, answers questions appropriately and responds to commands       CHRONIC MEDICAL DIAGNOSES:  Problem List as of 12/21/2021 Date Reviewed: 10/13/2020          Codes Class Noted - Resolved    Hydroureter ICD-10-CM: N13.4  ICD-9-CM: 593.5  12/19/2021 - Present        Calculus of kidney ICD-10-CM: N20.0  ICD-9-CM: 592.0  9/30/2020 - Present        Microscopic hematuria ICD-10-CM: R31.29  ICD-9-CM: 599.72  7/23/2020 - Present        Nocturia ICD-10-CM: R35.1  ICD-9-CM: 788.43  7/23/2020 - Present        Ureteric stone ICD-10-CM: N20.1  ICD-9-CM: 592.1  7/23/2020 - Present        Urethral stricture ICD-10-CM: N35.919  ICD-9-CM: 598.9  7/23/2020 - Present        UTI (urinary tract infection) ICD-10-CM: N39.0  ICD-9-CM: 599.0  7/23/2020 - Present        RESOLVED: Kidney stone ICD-10-CM: N20.0  ICD-9-CM: 592.0  7/23/2020 - 8/3/2021                  Signed:    Jules Dsouza MD  12/21/2021  4:21 PM

## 2021-12-21 NOTE — INTERVAL H&P NOTE
Update History & Physical    The Patient's History and Physical was reviewed with the patient and I examined the patient. There was no change. The surgical site was confirmed by the patient and me. Plan:  The risk, benefits, expected outcome, and alternative to the recommended procedure have been discussed with the patient. Patient understands and wants to proceed with the procedure.     Electronically signed by Reyes Tavera MD on 12/21/2021 at 10:44 AM

## 2021-12-21 NOTE — PROGRESS NOTES
..Bedside and Verbal shift change report given to Ilia Mcbride RN (oncoming nurse) by Al Rahman RN (offgoing nurse). Report included the following information SBAR, Kardex, Intake/Output, MAR, Accordion, Recent Results and Med Rec Status.

## 2021-12-21 NOTE — DISCHARGE INSTRUCTIONS
You should follow up with [facility] in about 1 week with an X-ray. We will work on scheduling this visit for you. Please arrive at least 15 minutes prior to the scheduled appointment to allow for enough time to check in. You will also be provided with a prescription for levofloxacin, an antibiotic that you should bring with you to your clinic visit to take on the day your stent is removed. You will be given a prescription for Tamsulosin (Flomax) to be used once a day on an as needed basis for flank pain that may be attributable to the stent that you have in place. Side effects from Tamsulosin include nasal congestion and dizziness when you stand. AZO (Pyridium) can help with urinary discomfort. This medication can be obtained at any pharmacy over-the-counter. Take AZO three times a day as needed. This medication turns the urine very orange, so do not be alarmed by this. You may take over-the-counter stool softeners (Senna, Colace) or laxatives such as MiraLAX or Milk of Magnesia if you are feeling constipated. You had a ureteral stent placed during your procedure. A stent is a plastic tube that runs from your kidney to the bladder. A stent allows your kidney to continue draining after the procedure despite normal postoperative swelling and protects the kidney from blockage. Your stent will stay in place until your postoperative visit. You may notice a string coming from the urethra which is attached to the stent. It is important that you do not accidentally pull the stent string. It is common to feel some burning with urination, a slight amount of flank fullness, and urgency to void as a result of your stent. Back or flank pain can be worse with urination as pressure transmits up the stent into the kidney. These symptoms are normal while the stent is in place. All stents are temporary and cannot stay in place for longer than a few months.      You may have some blood in the urine, frequent urination and pain or burning with urination for several days. This is expected after your procedure. [facility] ([phone] during business hours, or [phone] after hours) for fever >101F by mouth, uncontrolled nausea or vomiting, pain uncontrolled by medication, decreased urine output, persistent heavy blood in the urine or large clot passage; also call for any new or concerning symptoms. Driving should be avoided for at least 24 hours after surgery/anesthesia and longer if you are using oxycodone. Do not drive while taking narcotic pain medications. Take all medications as prescribed. Daily walks are encouraged. Prolonged sitting or lying in bed should be avoided. You can expect to return to work as soon as 2 days following surgery, or as instructed. Discharge Instructions       PATIENT ID: Indira Sorenson  MRN: [id number]   YOB: 1945    DATE OF ADMISSION: 12/19/2021  3:09 PM    DATE OF DISCHARGE: 12/21/2021    PRIMARY CARE PROVIDER: Christa Peoples DO     ATTENDING PHYSICIAN: Mayank Hill MD  DISCHARGING PROVIDER: Michelle Hobbs MD    To contact this individual call [phone] and ask the  to page. If unavailable ask to be transferred the Adult Hospitalist Department. DISCHARGE DIAGNOSES and CARE RECOMMENDATIONS:   Right ureteral kidney stone. You had cystoscopy, lithotripsy and stent placement. · Take the prescribed medications as directed. Follow-up with urology next week for stent removal.       Ureteroscopy: What to Expect at [address] [place] [address]  Most people are able to go home the same day of the procedure. But you may need to stay in the hospital. If you do, the stay is usually no more than 24 to 48 hours. For several hours after the procedure you may have a burning feeling when you urinate. This feeling should go away within a day. Drinking a lot of water can help.  Your doctor also may advise you to take medicine that numbs the burning. This medicine is called phenazopyridine. It is available by prescription and over the counter. Brand names include Pyridium and Uristat. You may have some blood in your urine for 2 or 3 days. Your doctor may prescribe an antibiotic for a day or two. This will help prevent an infection. This care sheet gives you a general idea about how long it will take for you to recover. But each person recovers at a different pace. Follow the steps below to feel better as quickly as possible. How can you care for yourself at home? Activity    You can go back to work and other activities the next day. Diet    Try to drink two 8-ounce glasses of water each hour for 2 hours after the procedure. This may help ease the burning when you urinate. Medicines    Your doctor will tell you if and when you can restart your medicines. He or she will also give you instructions about taking any new medicines.     If you take aspirin or some other blood thinner, ask your doctor if and when to start taking it again. Make sure that you understand exactly what your doctor wants you to do.     If you take medicine to stop the burning when you urinate, take it exactly as recommended. Be safe with medicines. Call your doctor if you think you are having a problem with your medicine. You will get more details on the specific medicine your doctor recommends.     If your doctor prescribed antibiotics, take them as directed. Do not stop taking them just because you feel better. You need to take the full course of antibiotics.     Ask your doctor if you can take an over-the-counter pain medicine, such as acetaminophen (Tylenol), ibuprofen (Advil, Motrin), or naproxen (Aleve). Read and follow all instructions on the label. Do not take two or more pain medicines at the same time unless the doctor told you to. Many pain medicines have acetaminophen, which is Tylenol. Too much acetaminophen (Tylenol) can be harmful. Heat    Take a warm bath. This may soothe the burning.     You also can hold a warm washcloth over your urethra for comfort. (The urethra is where your urine comes out.)   Follow-up care is a key part of your treatment and safety. Be sure to make and go to all appointments, and call your doctor if you are having problems. It's also a good idea to know your test results and keep a list of the medicines you take. When should you call for help? Call 911 anytime you think you may need emergency care. For example, call if:    You passed out (lost consciousness).     You have chest pain, are short of breath, or cough up blood. Call your doctor now or seek immediate medical care if:    You have pain that does not get better after you take pain medicine.     You have new or more blood clots in your urine. (It is normal for the urine to be pink for a few days.)     You cannot urinate.     You have symptoms of a urinary tract infection. These may include:  Pain or burning when you urinate. A frequent need to urinate without being able to pass much urine. Pain in the flank, which is just below the rib cage and above the waist on either side of the back. Blood in the urine. A fever.     You are sick to your stomach or cannot drink fluids.     You have signs of a blood clot in your leg (called a deep vein thrombosis), such as:  Pain in the calf, back of the knee, thigh, or groin. Redness and swelling in your leg. Watch closely for changes in your health, and be sure to contact your doctor if you are having any problems. Where can you learn more? Go to http://www.gray.com/  Enter P672 in the search box to learn more about \"Ureteroscopy: What to Expect at Home. \"  Current as of: December 17, 2020               Content Version: 13.0  © 7868-1095 [employer]. Care instructions adapted under license by [employer] (which disclaims liability or warranty for this information).  If you have questions about a medical condition or this instruction, always ask your healthcare professional. [place] [address] any warranty or liability for your use of this information. PENDING TEST RESULTS:   At the time of discharge the following test results are still pending: none    FOLLOW UP APPOINTMENTS:   Follow-up Information     Follow up With Specialties Details Why Rica Barger MD Urology On 12/28/2021 0800 at [facility]  [address] [place] [address]  [phone]               [address] [place] [address] BY THE FOLLOWING CONSULTATIONS:   IP CONSULT TO UROLOGY    YOU HAD THE FOLLOWING PROCEDURES/SURGERIES  :   Procedure(s):  CYSTOSCOPY RIGHT RETROGRADE, RIGHT URETEROSCOPY WITH HOLMIUM LASER AND STENT PLACEMENT (URGENT)              DISCHARGE MEDICATIONS:   See Medication Reconciliation Form    · It is important that you take the medication exactly as they are prescribed. · Keep your medication in the bottles provided by the pharmacist and keep a list of the medication names, dosages, and times to be taken in your wallet. · Do not take other medications without consulting your doctor. NOTIFY YOUR PHYSICIAN FOR ANY OF THE FOLLOWING:   Fever over 101 degrees for 24 hours. Chest pain, shortness of breath, fever, chills, nausea, vomiting, diarrhea, change in mentation, falling, weakness, bleeding. Severe pain or pain not relieved by medications. Or, any other signs or symptoms that you may have questions about. Signed:    Jules Dsouza MD  12/21/2021

## 2021-12-21 NOTE — PROGRESS NOTES
Patient returned from right cystoscopy, lithotripsy and stent placement. She said she has voided twice. She denied nausea, vomiting, fever or chills, abdominal pain. She wants to go home today and per urologist, okay to discharge patient as of yesterday. She will need outpatient urology follow-up with [facility] and stent removal in about a week.

## 2021-12-21 NOTE — BRIEF OP NOTE
Brief Postoperative Note    Patient: Tony Bennett  YOB: 1945  MRN: [id number]    Date of Procedure: 12/21/2021     Pre-Op Diagnosis: KIDNEY STONE    Post-Op Diagnosis: Same as preoperative diagnosis. Right distal ureteral stone. Procedure(s):  CYSTOSCOPY RIGHT RETROGRADE, RIGHT URETEROSCOPY WITH HOLMIUM LASER AND STENT PLACEMENT (URGENT)    Surgeon(s):  Barrett Conte MD    Surgical Assistant: None    Anesthesia: General     Estimated Blood Loss (mL): Minimal    Complications: None    Specimens:   ID Type Source Tests Collected by Time Destination   1 : Right Uretural Calculus Stone Analysis Fresh URETER, RIGHT  Barrett Conte MD 12/21/2021 1151 Pathology        Implants: None    Drains: Right 5Fr x 26cm JJ ureteral stent with dangler    Findings: Normal cystourethroscopy. Minor cystocele. Impacted distal right ureteral stone treated and removed. Moderate proximal right hydroureteronephrosis. Successful treatment of all right ureteral stone burden. Successful right stent placement, string tucked in vagina. Would be suitable for discharge from urologic perspective as early as later today if pain controlled upon waking up from anesthesia, hemodynamically stable with no concern of infection. Needs postop visit with [facility] for KUB + stent removal. I'll call in stent related meds on discharge.     Electronically Signed by Magdy Fowler MD on 12/21/2021 at 11:55 AM

## 2021-12-25 LAB
CALCIUM OXALATE DIHYDRATE MFR STONE IR: 100 %
COLOR STONE: NORMAL
COMMENT, 519994: NORMAL
COMPOSITION, 120440: NORMAL
DISCLAIMER, STO32L: NORMAL
PHOTO, 120675: NORMAL
PLEASE NOTE, 130422: NORMAL
SIZE STONE: NORMAL MM
SPECIMEN SOURCE: NORMAL
WT STONE: 2 MG

## 2022-03-18 PROBLEM — R35.1 NOCTURIA: Status: ACTIVE | Noted: 2020-07-23

## 2022-03-18 PROBLEM — N35.919 URETHRAL STRICTURE: Status: ACTIVE | Noted: 2020-07-23

## 2022-03-19 PROBLEM — N20.1 URETERIC STONE: Status: ACTIVE | Noted: 2020-07-23

## 2022-03-19 PROBLEM — N39.0 UTI (URINARY TRACT INFECTION): Status: ACTIVE | Noted: 2020-07-23

## 2022-03-19 PROBLEM — N13.4 HYDROURETER: Status: ACTIVE | Noted: 2021-12-19

## 2022-03-19 PROBLEM — R31.29 MICROSCOPIC HEMATURIA: Status: ACTIVE | Noted: 2020-07-23

## 2022-03-19 PROBLEM — N20.0 CALCULUS OF KIDNEY: Status: ACTIVE | Noted: 2020-09-30

## 2022-07-08 ENCOUNTER — TRANSCRIBE ORDER (OUTPATIENT)
Dept: SCHEDULING | Age: 77
End: 2022-07-08

## 2022-07-08 DIAGNOSIS — Z12.31 SCREENING MAMMOGRAM FOR HIGH-RISK PATIENT: Primary | ICD-10-CM

## 2022-08-31 ENCOUNTER — HOSPITAL ENCOUNTER (OUTPATIENT)
Dept: MAMMOGRAPHY | Age: 77
Discharge: HOME | End: 2022-08-31
Attending: SURGERY
Payer: MEDICARE

## 2022-08-31 DIAGNOSIS — Z12.31 SCREENING MAMMOGRAM FOR HIGH-RISK PATIENT: ICD-10-CM

## 2022-08-31 PROCEDURE — 77063 BREAST TOMOSYNTHESIS BI: CPT

## 2023-04-18 ENCOUNTER — HOSPITAL ENCOUNTER (OUTPATIENT)
Dept: MAMMOGRAPHY | Age: 78
Discharge: HOME | End: 2023-04-18
Payer: MEDICARE

## 2023-04-18 DIAGNOSIS — M85.88 OTHER SPECIFIED DISORDERS OF BONE DENSITY AND STRUCTURE, OTHER SITE: ICD-10-CM

## 2023-04-18 PROCEDURE — 77080 DXA BONE DENSITY AXIAL: CPT

## 2023-04-23 DIAGNOSIS — M85.88 OTHER SPECIFIED DISORDERS OF BONE DENSITY AND STRUCTURE, OTHER SITE: Primary | ICD-10-CM

## 2023-05-18 RX ORDER — ASPIRIN 81 MG/1
TABLET ORAL DAILY
COMMUNITY

## 2023-05-18 RX ORDER — ATORVASTATIN CALCIUM 10 MG/1
TABLET, FILM COATED ORAL DAILY
COMMUNITY

## 2023-05-18 RX ORDER — TAMSULOSIN HYDROCHLORIDE 0.4 MG/1
0.4 CAPSULE ORAL DAILY
COMMUNITY
Start: 2021-12-21

## 2023-05-18 RX ORDER — LISINOPRIL 5 MG/1
TABLET ORAL DAILY
COMMUNITY

## 2023-05-18 RX ORDER — LEVOFLOXACIN 500 MG/1
500 TABLET, FILM COATED ORAL DAILY
COMMUNITY
Start: 2021-12-21

## 2023-05-18 RX ORDER — LEVOTHYROXINE SODIUM 0.07 MG/1
TABLET ORAL
COMMUNITY

## 2023-05-18 RX ORDER — POLYETHYLENE GLYCOL 3350 17 G/17G
17 POWDER, FOR SOLUTION ORAL DAILY
COMMUNITY

## 2023-08-21 ENCOUNTER — TRANSCRIBE ORDERS (OUTPATIENT)
Facility: HOSPITAL | Age: 78
End: 2023-08-21

## 2023-08-21 DIAGNOSIS — Z12.31 ENCOUNTER FOR SCREENING MAMMOGRAM FOR MALIGNANT NEOPLASM OF BREAST: Primary | ICD-10-CM

## 2023-09-05 ENCOUNTER — HOSPITAL ENCOUNTER (OUTPATIENT)
Facility: HOSPITAL | Age: 78
Discharge: HOME | End: 2023-09-08
Payer: MEDICARE

## 2023-09-05 DIAGNOSIS — Z12.31 ENCOUNTER FOR SCREENING MAMMOGRAM FOR MALIGNANT NEOPLASM OF BREAST: ICD-10-CM

## 2023-09-05 PROCEDURE — 77063 BREAST TOMOSYNTHESIS BI: CPT

## 2024-09-01 ENCOUNTER — APPOINTMENT (OUTPATIENT)
Facility: HOSPITAL | Age: 79
End: 2024-09-01
Payer: MEDICARE

## 2024-09-01 ENCOUNTER — HOSPITAL ENCOUNTER (EMERGENCY)
Facility: HOSPITAL | Age: 79
Discharge: HOME | End: 2024-09-01
Attending: EMERGENCY MEDICINE
Payer: MEDICARE

## 2024-09-01 VITALS
SYSTOLIC BLOOD PRESSURE: 150 MMHG | HEIGHT: 66 IN | OXYGEN SATURATION: 98 % | BODY MASS INDEX: 29.57 KG/M2 | RESPIRATION RATE: 15 BRPM | HEART RATE: 73 BPM | WEIGHT: 184 LBS | TEMPERATURE: 98.4 F | DIASTOLIC BLOOD PRESSURE: 72 MMHG

## 2024-09-01 DIAGNOSIS — R10.13 ABDOMINAL PAIN, EPIGASTRIC: ICD-10-CM

## 2024-09-01 DIAGNOSIS — R07.9 CHEST PAIN, UNSPECIFIED TYPE: ICD-10-CM

## 2024-09-01 DIAGNOSIS — N39.0 URINARY TRACT INFECTION WITHOUT HEMATURIA, SITE UNSPECIFIED: Primary | ICD-10-CM

## 2024-09-01 LAB
ALBUMIN SERPL-MCNC: 3.5 G/DL (ref 3.5–5)
ALBUMIN/GLOB SERPL: 1.1 (ref 1.1–2.2)
ALP SERPL-CCNC: 46 U/L (ref 45–117)
ALT SERPL-CCNC: 34 U/L (ref 12–78)
ANION GAP SERPL CALC-SCNC: 7 MMOL/L (ref 5–15)
APPEARANCE UR: CLEAR
AST SERPL W P-5'-P-CCNC: 21 U/L (ref 15–37)
BACTERIA URNS QL MICRO: NEGATIVE /HPF
BASOPHILS # BLD: 0 K/UL (ref 0–0.1)
BASOPHILS NFR BLD: 0 % (ref 0–1)
BILIRUB SERPL-MCNC: 0.2 MG/DL (ref 0.2–1)
BILIRUB UR QL: NEGATIVE
BUN SERPL-MCNC: 20 MG/DL (ref 6–20)
BUN/CREAT SERPL: 25 (ref 12–20)
CA-I BLD-MCNC: 9.7 MG/DL (ref 8.5–10.1)
CHLORIDE SERPL-SCNC: 109 MMOL/L (ref 97–108)
CO2 SERPL-SCNC: 25 MMOL/L (ref 21–32)
COLOR UR: ABNORMAL
CREAT SERPL-MCNC: 0.81 MG/DL (ref 0.55–1.02)
DIFFERENTIAL METHOD BLD: NORMAL
EOSINOPHIL # BLD: 0.2 K/UL (ref 0–0.4)
EOSINOPHIL NFR BLD: 3 % (ref 0–7)
EPITH CASTS URNS QL MICRO: ABNORMAL /LPF
ERYTHROCYTE [DISTWIDTH] IN BLOOD BY AUTOMATED COUNT: 13.5 % (ref 11.5–14.5)
GLOBULIN SER CALC-MCNC: 3.3 G/DL (ref 2–4)
GLUCOSE SERPL-MCNC: 101 MG/DL (ref 65–100)
GLUCOSE UR STRIP.AUTO-MCNC: NEGATIVE MG/DL
HCT VFR BLD AUTO: 41.1 % (ref 35–47)
HGB BLD-MCNC: 13.5 G/DL (ref 11.5–16)
HGB UR QL STRIP: ABNORMAL
IMM GRANULOCYTES # BLD AUTO: 0 K/UL (ref 0–0.04)
IMM GRANULOCYTES NFR BLD AUTO: 0 % (ref 0–0.5)
KETONES UR QL STRIP.AUTO: NEGATIVE MG/DL
LEUKOCYTE ESTERASE UR QL STRIP.AUTO: ABNORMAL
LIPASE SERPL-CCNC: 25 U/L (ref 13–75)
LYMPHOCYTES # BLD: 2.9 K/UL (ref 0.8–3.5)
LYMPHOCYTES NFR BLD: 32 % (ref 12–49)
MCH RBC QN AUTO: 28.9 PG (ref 26–34)
MCHC RBC AUTO-ENTMCNC: 32.8 G/DL (ref 30–36.5)
MCV RBC AUTO: 88 FL (ref 80–99)
MONOCYTES # BLD: 0.8 K/UL (ref 0–1)
MONOCYTES NFR BLD: 8 % (ref 5–13)
MUCOUS THREADS URNS QL MICRO: ABNORMAL /LPF
NEUTS SEG # BLD: 5.2 K/UL (ref 1.8–8)
NEUTS SEG NFR BLD: 57 % (ref 32–75)
NITRITE UR QL STRIP.AUTO: NEGATIVE
NRBC # BLD: 0 K/UL (ref 0–0.01)
NRBC BLD-RTO: 0 PER 100 WBC
PH UR STRIP: 6 (ref 5–8)
PLATELET # BLD AUTO: 229 K/UL (ref 150–400)
PMV BLD AUTO: 10.3 FL (ref 8.9–12.9)
POTASSIUM SERPL-SCNC: 3.9 MMOL/L (ref 3.5–5.1)
PROT SERPL-MCNC: 6.8 G/DL (ref 6.4–8.2)
PROT UR STRIP-MCNC: NEGATIVE MG/DL
RBC # BLD AUTO: 4.67 M/UL (ref 3.8–5.2)
RBC #/AREA URNS HPF: ABNORMAL /HPF (ref 0–5)
SODIUM SERPL-SCNC: 141 MMOL/L (ref 136–145)
SP GR UR REFRACTOMETRY: 1.01 (ref 1–1.03)
TROPONIN I SERPL HS-MCNC: 13 NG/L (ref 0–51)
TROPONIN I SERPL HS-MCNC: 16 NG/L (ref 0–51)
URINE CULTURE IF INDICATED: ABNORMAL
UROBILINOGEN UR QL STRIP.AUTO: 0.1 EU/DL (ref 0.1–1)
WBC # BLD AUTO: 9.1 K/UL (ref 3.6–11)
WBC URNS QL MICRO: ABNORMAL /HPF (ref 0–4)

## 2024-09-01 PROCEDURE — 71046 X-RAY EXAM CHEST 2 VIEWS: CPT

## 2024-09-01 PROCEDURE — 84484 ASSAY OF TROPONIN QUANT: CPT

## 2024-09-01 PROCEDURE — 99285 EMERGENCY DEPT VISIT HI MDM: CPT

## 2024-09-01 PROCEDURE — 87088 URINE BACTERIA CULTURE: CPT

## 2024-09-01 PROCEDURE — 87086 URINE CULTURE/COLONY COUNT: CPT

## 2024-09-01 PROCEDURE — 83690 ASSAY OF LIPASE: CPT

## 2024-09-01 PROCEDURE — 81001 URINALYSIS AUTO W/SCOPE: CPT

## 2024-09-01 PROCEDURE — 80053 COMPREHEN METABOLIC PANEL: CPT

## 2024-09-01 PROCEDURE — 85025 COMPLETE CBC W/AUTO DIFF WBC: CPT

## 2024-09-01 PROCEDURE — 93005 ELECTROCARDIOGRAM TRACING: CPT | Performed by: EMERGENCY MEDICINE

## 2024-09-01 PROCEDURE — 87186 SC STD MICRODIL/AGAR DIL: CPT

## 2024-09-01 PROCEDURE — 36415 COLL VENOUS BLD VENIPUNCTURE: CPT

## 2024-09-01 RX ORDER — NITROFURANTOIN 25; 75 MG/1; MG/1
100 CAPSULE ORAL 2 TIMES DAILY
Qty: 14 CAPSULE | Refills: 0 | Status: SHIPPED | OUTPATIENT
Start: 2024-09-01 | End: 2024-09-08

## 2024-09-01 ASSESSMENT — PAIN SCALES - GENERAL
PAINLEVEL_OUTOF10: 0
PAINLEVEL_OUTOF10: 0
PAINLEVEL_OUTOF10: 6

## 2024-09-01 ASSESSMENT — LIFESTYLE VARIABLES
HOW OFTEN DO YOU HAVE A DRINK CONTAINING ALCOHOL: NEVER
HOW MANY STANDARD DRINKS CONTAINING ALCOHOL DO YOU HAVE ON A TYPICAL DAY: PATIENT DOES NOT DRINK

## 2024-09-01 NOTE — DISCHARGE INSTRUCTIONS
Calcium 9.7 8.5 - 10.1 mg/dL    Total Bilirubin 0.2 0.2 - 1.0 mg/dL    AST 21 15 - 37 U/L    ALT 34 12 - 78 U/L    Alk Phosphatase 46 45 - 117 U/L    Total Protein 6.8 6.4 - 8.2 g/dL    Albumin 3.5 3.5 - 5.0 g/dL    Globulin 3.3 2.0 - 4.0 g/dL    Albumin/Globulin Ratio 1.1 1.1 - 2.2     Troponin    Collection Time: 09/01/24  5:55 PM   Result Value Ref Range    Troponin, High Sensitivity 13 0 - 51 ng/L   Lipase    Collection Time: 09/01/24  5:55 PM   Result Value Ref Range    Lipase 25 13 - 75 U/L   Urinalysis with Reflex to Culture    Collection Time: 09/01/24  6:30 PM    Specimen: Urine   Result Value Ref Range    Color, UA Yellow/Straw      Appearance Clear Clear      Specific Gravity, UA 1.008 1.003 - 1.030      pH, Urine 6.0 5.0 - 8.0      Protein, UA Negative Negative mg/dL    Glucose, Ur Negative Negative mg/dL    Ketones, Urine Negative Negative mg/dL    Bilirubin, Urine Negative Negative      Blood, Urine Moderate (A) Negative      Urobilinogen, Urine 0.1 0.1 - 1.0 EU/dL    Nitrite, Urine Negative Negative      Leukocyte Esterase, Urine Large (A) Negative      WBC, UA  0 - 4 /hpf    RBC, UA 0-5 0 - 5 /hpf    Epithelial Cells, UA Few Few /lpf    BACTERIA, URINE Negative Negative /hpf    Urine Culture if Indicated Urine Culture Ordered (A) Culture not indicated by UA result      Mucus, UA Trace (A) Negative /lpf   Troponin    Collection Time: 09/01/24  7:20 PM   Result Value Ref Range    Troponin, High Sensitivity 16 0 - 51 ng/L       Radiologic Studies  XR CHEST (2 VW)   Final Result      No acute process seen.         Electronically signed by Ekta Velasco        ------------------------------------------------------------------------------------------------------------  The evaluation and treatment you received in the Emergency Department were for an urgent problem. It is important that you follow-up with a doctor, nurse practitioner, or physician assistant to:  (1) confirm your diagnosis,  (2)

## 2024-09-01 NOTE — ED PROVIDER NOTES
[facility] EMERGENCY DEPT  EMERGENCY DEPARTMENT HISTORY AND PHYSICAL EXAM      Date: 9/1/2024  Patient Name: Asmita Hsu  MRN: [id number]  YOB: 1945  Date of evaluation: 9/1/2024  Provider: Jason Mcfadden MD   Note Started: 6:16 PM EDT 9/1/24    HISTORY OF PRESENT ILLNESS     Chief Complaint   Patient presents with    Chest Pain       History Provided By: Patient    HPI: Asmita Hsu is a 79 y.o. female With history of asthma, hypertension, hyperlipidemia here with chest pain and abdominal pain.  Patient initially called her daughter due to left sided chest pain which lasted about 20 to 30 minutes while she was sitting on the front porch in the rocking chair.  Pain is since resolved.  No shortness of breath.    Patient also reports epigastric abdominal discomfort which occurred while she was going to bed last night.  No nausea, vomiting, diarrhea.  This pain is also resolved.    Patient has been urinating frequently today. Denies dysuria or hematuria    PAST MEDICAL HISTORY   Past Medical History:  Past Medical History:   Diagnosis Date    Arthritis     Asthma     Hypercholesterolemia     Hypertension     Kidney stone 7/23/2020    Long term current use of anticoagulant therapy     asa 81mg qd    Microscopic hematuria 7/23/2020    Shoulder fracture, right     NO SURGERY    Thyroid disease     Ureteric stone 7/23/2020    Urethral stricture 7/23/2020    UTI (urinary tract infection) 7/23/2020       Past Surgical History:  Past Surgical History:   Procedure Laterality Date    COLONOSCOPY      HYSTERECTOMY (CERVIX STATUS UNKNOWN)      LITHOTRIPSY      LITHOTRIPSY  10/21/2020    REFRACTIVE SURGERY Bilateral     CATARACT REMOVAL    THYROIDECTOMY      TURP      cystoscopy    UROLOGICAL SURGERY         Family History:  Family History   Problem Relation Age of Onset    Cancer Brother     Heart Disease Father     Alzheimer's Disease Mother     Cancer Brother     Cancer Sister        Social History:  Social

## 2024-09-02 LAB
EKG ATRIAL RATE: 82 BPM
EKG DIAGNOSIS: NORMAL
EKG P AXIS: 65 DEGREES
EKG P-R INTERVAL: 210 MS
EKG Q-T INTERVAL: 352 MS
EKG QRS DURATION: 72 MS
EKG QTC CALCULATION (BAZETT): 411 MS
EKG R AXIS: 67 DEGREES
EKG T AXIS: 64 DEGREES
EKG VENTRICULAR RATE: 82 BPM

## 2024-09-04 LAB
BACTERIA SPEC CULT: ABNORMAL
BACTERIA SPEC CULT: ABNORMAL
COLONY COUNT, CNT: ABNORMAL
COLONY COUNT, CNT: ABNORMAL
Lab: ABNORMAL

## 2025-08-07 ENCOUNTER — APPOINTMENT (OUTPATIENT)
Facility: HOSPITAL | Age: 80
End: 2025-08-07
Payer: MEDICARE

## 2025-08-07 ENCOUNTER — HOSPITAL ENCOUNTER (EMERGENCY)
Facility: HOSPITAL | Age: 80
Discharge: HOME | End: 2025-08-07
Attending: EMERGENCY MEDICINE
Payer: MEDICARE

## 2025-08-07 VITALS
RESPIRATION RATE: 20 BRPM | SYSTOLIC BLOOD PRESSURE: 133 MMHG | HEIGHT: 66 IN | DIASTOLIC BLOOD PRESSURE: 67 MMHG | TEMPERATURE: 98.2 F | HEART RATE: 73 BPM | BODY MASS INDEX: 29.73 KG/M2 | WEIGHT: 185 LBS | OXYGEN SATURATION: 99 %

## 2025-08-07 DIAGNOSIS — N39.0 URINARY TRACT INFECTION WITHOUT HEMATURIA, SITE UNSPECIFIED: Primary | ICD-10-CM

## 2025-08-07 LAB
ALBUMIN SERPL-MCNC: 2.8 G/DL (ref 3.5–5)
ALBUMIN/GLOB SERPL: 0.6 (ref 1.1–2.2)
ALP SERPL-CCNC: 66 U/L (ref 45–117)
ALT SERPL-CCNC: 32 U/L (ref 12–78)
ANION GAP SERPL CALC-SCNC: 6 MMOL/L (ref 2–12)
APPEARANCE UR: ABNORMAL
AST SERPL W P-5'-P-CCNC: 24 U/L (ref 15–37)
BACTERIA URNS QL MICRO: NEGATIVE /HPF
BASOPHILS # BLD: 0.04 K/UL (ref 0–0.1)
BASOPHILS NFR BLD: 0.4 % (ref 0–1)
BILIRUB SERPL-MCNC: 0.3 MG/DL (ref 0.2–1)
BILIRUB UR QL: NEGATIVE
BUN SERPL-MCNC: 15 MG/DL (ref 6–20)
BUN/CREAT SERPL: 19 (ref 12–20)
CA-I BLD-MCNC: 9.6 MG/DL (ref 8.5–10.1)
CHLORIDE SERPL-SCNC: 105 MMOL/L (ref 97–108)
CO2 SERPL-SCNC: 27 MMOL/L (ref 21–32)
COLOR UR: ABNORMAL
CREAT SERPL-MCNC: 0.81 MG/DL (ref 0.55–1.02)
DIFFERENTIAL METHOD BLD: ABNORMAL
EOSINOPHIL # BLD: 0.19 K/UL (ref 0–0.4)
EOSINOPHIL NFR BLD: 1.7 % (ref 0–7)
EPITH CASTS URNS QL MICRO: ABNORMAL /LPF
ERYTHROCYTE [DISTWIDTH] IN BLOOD BY AUTOMATED COUNT: 12.6 % (ref 11.5–14.5)
GLOBULIN SER CALC-MCNC: 4.5 G/DL (ref 2–4)
GLUCOSE SERPL-MCNC: 106 MG/DL (ref 65–100)
GLUCOSE UR STRIP.AUTO-MCNC: NEGATIVE MG/DL
HCT VFR BLD AUTO: 38.1 % (ref 35–47)
HGB BLD-MCNC: 12.5 G/DL (ref 11.5–16)
HGB UR QL STRIP: ABNORMAL
IMM GRANULOCYTES # BLD AUTO: 0.06 K/UL (ref 0–0.04)
IMM GRANULOCYTES NFR BLD AUTO: 0.5 % (ref 0–0.5)
KETONES UR QL STRIP.AUTO: NEGATIVE MG/DL
LEUKOCYTE ESTERASE UR QL STRIP.AUTO: ABNORMAL
LYMPHOCYTES # BLD: 2.61 K/UL (ref 0.8–3.5)
LYMPHOCYTES NFR BLD: 23.4 % (ref 12–49)
MCH RBC QN AUTO: 27.9 PG (ref 26–34)
MCHC RBC AUTO-ENTMCNC: 32.8 G/DL (ref 30–36.5)
MCV RBC AUTO: 85 FL (ref 80–99)
MONOCYTES # BLD: 0.97 K/UL (ref 0–1)
MONOCYTES NFR BLD: 8.7 % (ref 5–13)
MUCOUS THREADS URNS QL MICRO: ABNORMAL /LPF
NEUTS SEG # BLD: 7.27 K/UL (ref 1.8–8)
NEUTS SEG NFR BLD: 65.3 % (ref 32–75)
NITRITE UR QL STRIP.AUTO: NEGATIVE
NRBC # BLD: 0 K/UL (ref 0–0.01)
NRBC BLD-RTO: 0 PER 100 WBC
PH UR STRIP: 6 (ref 5–8)
PLATELET # BLD AUTO: 427 K/UL (ref 150–400)
PMV BLD AUTO: 9.3 FL (ref 8.9–12.9)
POTASSIUM SERPL-SCNC: 4 MMOL/L (ref 3.5–5.1)
PROT SERPL-MCNC: 7.3 G/DL (ref 6.4–8.2)
PROT UR STRIP-MCNC: NEGATIVE MG/DL
RBC # BLD AUTO: 4.48 M/UL (ref 3.8–5.2)
RBC #/AREA URNS HPF: ABNORMAL /HPF (ref 0–5)
SODIUM SERPL-SCNC: 138 MMOL/L (ref 136–145)
SP GR UR REFRACTOMETRY: 1.01 (ref 1–1.03)
URINE CULTURE IF INDICATED: ABNORMAL
UROBILINOGEN UR QL STRIP.AUTO: 0.1 EU/DL (ref 0.1–1)
WBC # BLD AUTO: 11.1 K/UL (ref 3.6–11)
WBC URNS QL MICRO: >100 /HPF (ref 0–4)

## 2025-08-07 PROCEDURE — 6370000000 HC RX 637 (ALT 250 FOR IP): Performed by: EMERGENCY MEDICINE

## 2025-08-07 PROCEDURE — 6360000002 HC RX W HCPCS: Performed by: EMERGENCY MEDICINE

## 2025-08-07 PROCEDURE — 96374 THER/PROPH/DIAG INJ IV PUSH: CPT

## 2025-08-07 PROCEDURE — 2500000003 HC RX 250 WO HCPCS: Performed by: EMERGENCY MEDICINE

## 2025-08-07 PROCEDURE — 81001 URINALYSIS AUTO W/SCOPE: CPT

## 2025-08-07 PROCEDURE — 94761 N-INVAS EAR/PLS OXIMETRY MLT: CPT

## 2025-08-07 PROCEDURE — 74176 CT ABD & PELVIS W/O CONTRAST: CPT

## 2025-08-07 PROCEDURE — 87147 CULTURE TYPE IMMUNOLOGIC: CPT

## 2025-08-07 PROCEDURE — 99284 EMERGENCY DEPT VISIT MOD MDM: CPT

## 2025-08-07 PROCEDURE — 80053 COMPREHEN METABOLIC PANEL: CPT

## 2025-08-07 PROCEDURE — 85025 COMPLETE CBC W/AUTO DIFF WBC: CPT

## 2025-08-07 PROCEDURE — 87086 URINE CULTURE/COLONY COUNT: CPT

## 2025-08-07 PROCEDURE — 36415 COLL VENOUS BLD VENIPUNCTURE: CPT

## 2025-08-07 RX ORDER — CEPHALEXIN 500 MG/1
500 CAPSULE ORAL
Status: DISCONTINUED | OUTPATIENT
Start: 2025-08-07 | End: 2025-08-07

## 2025-08-07 RX ORDER — CEFDINIR 300 MG/1
300 CAPSULE ORAL 2 TIMES DAILY
Qty: 20 CAPSULE | Refills: 0 | Status: SHIPPED | OUTPATIENT
Start: 2025-08-07 | End: 2025-08-17

## 2025-08-07 RX ORDER — ACETAMINOPHEN 500 MG
1000 TABLET ORAL
Status: COMPLETED | OUTPATIENT
Start: 2025-08-07 | End: 2025-08-07

## 2025-08-07 RX ADMIN — ACETAMINOPHEN 1000 MG: 500 TABLET ORAL at 19:06

## 2025-08-07 RX ADMIN — CEFTRIAXONE SODIUM 1000 MG: 1 INJECTION, POWDER, FOR SOLUTION INTRAMUSCULAR; INTRAVENOUS at 21:15

## 2025-08-07 ASSESSMENT — PAIN DESCRIPTION - LOCATION: LOCATION: BACK

## 2025-08-07 ASSESSMENT — PAIN - FUNCTIONAL ASSESSMENT
PAIN_FUNCTIONAL_ASSESSMENT: NONE - DENIES PAIN
PAIN_FUNCTIONAL_ASSESSMENT: 0-10

## 2025-08-07 ASSESSMENT — PAIN SCALES - GENERAL
PAINLEVEL_OUTOF10: 8
PAINLEVEL_OUTOF10: 0

## 2025-08-09 LAB
BACTERIA SPEC CULT: ABNORMAL
BACTERIA SPEC CULT: ABNORMAL
COLONY COUNT, CNT: ABNORMAL
Lab: ABNORMAL

## (undated) DEVICE — SOLUTION IRRIG 3000ML 0.9% SOD CHL USP UROMATIC PLAS CONT

## (undated) DEVICE — TURNOVER KIT OR CUST CMB FLD GEN LF

## (undated) DEVICE — GLOVE ORANGE PI 7 1/2   MSG9075

## (undated) DEVICE — INFECTION CONTROL KIT SYS

## (undated) DEVICE — STRAP,POSITIONING,KNEE/BODY,FOAM,4X60": Brand: MEDLINE

## (undated) DEVICE — GLOVE SURG SZ 75 L12IN FNGR THK79MIL GRN LTX FREE

## (undated) DEVICE — SOLUTION IRRIG 1000ML STRL H2O USP PLAS POUR BTL

## (undated) DEVICE — GDWIRE UROL STR 150CM FLX TP -- BX/5 SENSOR

## (undated) DEVICE — NITINOL STONE RETRIEVAL BASKET: Brand: ZERO TIP

## (undated) DEVICE — BASIC SINGLE BASIN-LF: Brand: MEDLINE INDUSTRIES, INC.

## (undated) DEVICE — SYR 10ML LUER LOK 1/5ML GRAD --

## (undated) DEVICE — HIGH POWER SINGLE-USE LASER FIBER: Brand: FLEXIVA™ ID

## (undated) DEVICE — CYSTO-SMH: Brand: MEDLINE INDUSTRIES, INC.

## (undated) DEVICE — OPEN-END URETERAL CATHETER: Brand: COOK

## (undated) DEVICE — GARMENT,MEDLINE,DVT,INT,CALF,MED, GEN2: Brand: MEDLINE

## (undated) DEVICE — Y-TYPE TUR/BLADDER IRRIGATION SET, REGULATING CLAMP